# Patient Record
Sex: FEMALE | Race: BLACK OR AFRICAN AMERICAN | Employment: OTHER | URBAN - METROPOLITAN AREA
[De-identification: names, ages, dates, MRNs, and addresses within clinical notes are randomized per-mention and may not be internally consistent; named-entity substitution may affect disease eponyms.]

---

## 2018-01-10 ENCOUNTER — APPOINTMENT (OUTPATIENT)
Dept: GENERAL RADIOLOGY | Age: 83
End: 2018-01-10
Attending: EMERGENCY MEDICINE
Payer: MEDICARE

## 2018-01-10 ENCOUNTER — APPOINTMENT (OUTPATIENT)
Dept: CT IMAGING | Age: 83
End: 2018-01-10
Attending: EMERGENCY MEDICINE
Payer: MEDICARE

## 2018-01-10 ENCOUNTER — HOSPITAL ENCOUNTER (EMERGENCY)
Age: 83
Discharge: HOME | End: 2018-01-10
Attending: EMERGENCY MEDICINE
Payer: MEDICARE

## 2018-01-10 VITALS
TEMPERATURE: 98.4 F | HEIGHT: 64 IN | OXYGEN SATURATION: 96 % | DIASTOLIC BLOOD PRESSURE: 86 MMHG | SYSTOLIC BLOOD PRESSURE: 99 MMHG | RESPIRATION RATE: 23 BRPM | HEART RATE: 67 BPM | WEIGHT: 104.7 LBS | BODY MASS INDEX: 17.87 KG/M2

## 2018-01-10 DIAGNOSIS — R55 SYNCOPE AND COLLAPSE: Primary | ICD-10-CM

## 2018-01-10 LAB
ALBUMIN SERPL-MCNC: 3.2 G/DL (ref 3.5–5)
ALBUMIN/GLOB SERPL: 0.8 {RATIO} (ref 1.1–2.2)
ALP SERPL-CCNC: 68 U/L (ref 45–117)
ALT SERPL-CCNC: 16 U/L (ref 12–78)
ANION GAP SERPL CALC-SCNC: 8 MMOL/L (ref 5–15)
APPEARANCE UR: ABNORMAL
AST SERPL-CCNC: 18 U/L (ref 15–37)
BACTERIA URNS QL MICRO: NEGATIVE /HPF
BASOPHILS # BLD: 0 K/UL (ref 0–0.1)
BASOPHILS NFR BLD: 0 % (ref 0–1)
BILIRUB SERPL-MCNC: 0.4 MG/DL (ref 0.2–1)
BILIRUB UR QL CFM: NEGATIVE
BUN SERPL-MCNC: 30 MG/DL (ref 6–20)
BUN/CREAT SERPL: 16 (ref 12–20)
CALCIUM SERPL-MCNC: 8.8 MG/DL (ref 8.5–10.1)
CHLORIDE SERPL-SCNC: 105 MMOL/L (ref 97–108)
CO2 SERPL-SCNC: 28 MMOL/L (ref 21–32)
COLOR UR: ABNORMAL
CREAT SERPL-MCNC: 1.88 MG/DL (ref 0.55–1.02)
DIFFERENTIAL METHOD BLD: ABNORMAL
EOSINOPHIL # BLD: 0 K/UL (ref 0–0.4)
EOSINOPHIL NFR BLD: 0 % (ref 0–7)
EPITH CASTS URNS QL MICRO: ABNORMAL /LPF
ERYTHROCYTE [DISTWIDTH] IN BLOOD BY AUTOMATED COUNT: 13.2 % (ref 11.5–14.5)
GLOBULIN SER CALC-MCNC: 3.8 G/DL (ref 2–4)
GLUCOSE SERPL-MCNC: 173 MG/DL (ref 65–100)
GLUCOSE UR STRIP.AUTO-MCNC: NEGATIVE MG/DL
HCT VFR BLD AUTO: 41.6 % (ref 35–47)
HGB BLD-MCNC: 13.3 G/DL (ref 11.5–16)
HGB UR QL STRIP: ABNORMAL
INR BLD: 1.2 (ref 0.9–1.2)
KETONES UR QL STRIP.AUTO: NEGATIVE MG/DL
LEUKOCYTE ESTERASE UR QL STRIP.AUTO: NEGATIVE
LYMPHOCYTES # BLD: 0.5 K/UL (ref 0.8–3.5)
LYMPHOCYTES NFR BLD: 7 % (ref 12–49)
MAGNESIUM SERPL-MCNC: 2.2 MG/DL (ref 1.6–2.4)
MCH RBC QN AUTO: 30.6 PG (ref 26–34)
MCHC RBC AUTO-ENTMCNC: 32 G/DL (ref 30–36.5)
MCV RBC AUTO: 95.6 FL (ref 80–99)
MONOCYTES # BLD: 0.6 K/UL (ref 0–1)
MONOCYTES NFR BLD: 8 % (ref 5–13)
NEUTS SEG # BLD: 5.8 K/UL (ref 1.8–8)
NEUTS SEG NFR BLD: 85 % (ref 32–75)
NITRITE UR QL STRIP.AUTO: NEGATIVE
PH UR STRIP: 5 [PH] (ref 5–8)
PLATELET # BLD AUTO: 166 K/UL (ref 150–400)
POTASSIUM SERPL-SCNC: 4 MMOL/L (ref 3.5–5.1)
PROT SERPL-MCNC: 7 G/DL (ref 6.4–8.2)
PROT UR STRIP-MCNC: 100 MG/DL
RBC # BLD AUTO: 4.35 M/UL (ref 3.8–5.2)
RBC #/AREA URNS HPF: ABNORMAL /HPF (ref 0–5)
RBC MORPH BLD: ABNORMAL
SODIUM SERPL-SCNC: 141 MMOL/L (ref 136–145)
SP GR UR REFRACTOMETRY: 1.02 (ref 1–1.03)
UROBILINOGEN UR QL STRIP.AUTO: 1 EU/DL (ref 0.2–1)
WBC # BLD AUTO: 6.9 K/UL (ref 3.6–11)
WBC URNS QL MICRO: ABNORMAL /HPF (ref 0–4)

## 2018-01-10 PROCEDURE — 85025 COMPLETE CBC W/AUTO DIFF WBC: CPT

## 2018-01-10 PROCEDURE — 99285 EMERGENCY DEPT VISIT HI MDM: CPT

## 2018-01-10 PROCEDURE — 93005 ELECTROCARDIOGRAM TRACING: CPT

## 2018-01-10 PROCEDURE — 70450 CT HEAD/BRAIN W/O DYE: CPT

## 2018-01-10 PROCEDURE — 85610 PROTHROMBIN TIME: CPT

## 2018-01-10 PROCEDURE — 81001 URINALYSIS AUTO W/SCOPE: CPT

## 2018-01-10 PROCEDURE — 80053 COMPREHEN METABOLIC PANEL: CPT

## 2018-01-10 PROCEDURE — 71045 X-RAY EXAM CHEST 1 VIEW: CPT

## 2018-01-10 PROCEDURE — 83735 ASSAY OF MAGNESIUM: CPT

## 2018-01-10 PROCEDURE — 36415 COLL VENOUS BLD VENIPUNCTURE: CPT

## 2018-01-10 NOTE — ED NOTES
Daughter reports she went over to her brothers house to take her some cold medicine. Says she became slower to respond 30 minutes after taking the medicine. Tylenol Cold + Mucus Severe Cool Burst Liquid.

## 2018-01-10 NOTE — ED TRIAGE NOTES
Triage note: pt arrives via EMS from home for acute AMS since 1400. Pt has hx of dementia, per ems pt's daughter was unable to verbalize specifically what had changed. Pt is alert on arrival but confused, which is her baseline. Pt moving all extremities, no sensory deficits noted. Some difficulty in obtaining accurate exam due to confusion.

## 2018-01-10 NOTE — DISCHARGE INSTRUCTIONS
Fainting: Care Instructions  Your Care Instructions    When you faint, or pass out, you lose consciousness for a short time. A brief drop in blood flow to the brain often causes it. When you fall or lie down, more blood flows to your brain and you regain consciousness. Emotional stress, pain, or overheating-especially if you have been standing-can make you faint. In these cases, fainting is usually not serious. But fainting can be a sign of a more serious problem. Your doctor may want you to have more tests to rule out other causes. The treatment you need depends on the reason why you fainted. The doctor has checked you carefully, but problems can develop later. If you notice any problems or new symptoms, get medical treatment right away. Follow-up care is a key part of your treatment and safety. Be sure to make and go to all appointments, and call your doctor if you are having problems. It's also a good idea to know your test results and keep a list of the medicines you take. How can you care for yourself at home? · Drink plenty of fluids to prevent dehydration. If you have kidney, heart, or liver disease and have to limit fluids, talk with your doctor before you increase your fluid intake. When should you call for help? Call 911 anytime you think you may need emergency care. For example, call if:  ? · You have symptoms of a heart problem. These may include:  ¨ Chest pain or pressure. ¨ Severe trouble breathing. ¨ A fast or irregular heartbeat. ¨ Lightheadedness or sudden weakness. ¨ Coughing up pink, foamy mucus. ¨ Passing out. After you call 911, the  may tell you to chew 1 adult-strength or 2 to 4 low-dose aspirin. Wait for an ambulance. Do not try to drive yourself. ? · You have symptoms of a stroke. These may include:  ¨ Sudden numbness, tingling, weakness, or loss of movement in your face, arm, or leg, especially on only one side of your body. ¨ Sudden vision changes.   ¨ Sudden trouble speaking. ¨ Sudden confusion or trouble understanding simple statements. ¨ Sudden problems with walking or balance. ¨ A sudden, severe headache that is different from past headaches. ? · You passed out (lost consciousness) again. ? Watch closely for changes in your health, and be sure to contact your doctor if:  ? · You do not get better as expected. Where can you learn more? Go to http://damion-derick.info/. Enter S331 in the search box to learn more about \"Fainting: Care Instructions. \"  Current as of: March 20, 2017  Content Version: 11.4  © 1310-0748 [employer]. Care instructions adapted under license by [employer] (which disclaims liability or warranty for this information). If you have questions about a medical condition or this instruction, always ask your healthcare professional. [address] any warranty or liability for your use of this information.

## 2018-01-10 NOTE — ED PROVIDER NOTES
HPI Comments: 80 y.o. female with past medical history significant for Hypertension and Dementia who presents from Home via EMS with chief complaint of Evaluation for Altered Mental Status. EMS states they were called after patient's daughter \"noticed a change in the patient's cognition at around 1400\". Patient has a previous history of Dementia and is not orientated to date at baseline per patients daughter. Patient arrives to [facility] ED at baseline. Per EMS upon arrival patient was ambulatory on scene. Pt denies any acute or discomfort. Per patient's daughter, this morning patient appeared to be \"moving slower than usual, seemed slower to respond, and patient appeared to have a cold coming on\". Patient's daughter stated this afternoon she gave the patient Tylenol cold and mucus medicine and two cough drops. At around 1400 patient's daughter states she found that the patient was slumped over on floor beside the sofa and at which time patient's daughter had noticed patient defecated on herself with an episode of diarrhea. Pt's family reports recent exposure to illness described as \"patient's son and  have a cold\". There are no other acute medical concerns at this time. PCP: Leander Juarez MD    Note written by June Coffey, as dictated by Olga Stallings MD 5:18 PM    The history is provided by the patient, the EMS personnel and the nursing home. History limited by: Dementia. Past Medical History:   Diagnosis Date    DEMENTIA     Hypertension        History reviewed. No pertinent surgical history. History reviewed. No pertinent family history. Social History     Social History    Marital status:      Spouse name: N/A    Number of children: N/A    Years of education: N/A     Occupational History    Not on file.      Social History Main Topics    Smoking status: Former Smoker    Smokeless tobacco: Not on file    Alcohol use No    Drug use: No    Sexual activity: Not on file     Other Topics Concern    Not on file     Social History Narrative         ALLERGIES: Review of patient's allergies indicates no known allergies. Review of Systems   Constitutional: Negative for chills and fever. HENT: Negative for congestion. Respiratory: Negative for shortness of breath. Cardiovascular: Negative for chest pain. Gastrointestinal: Positive for diarrhea. Negative for abdominal pain, nausea and vomiting. Genitourinary: Negative for difficulty urinating and dysuria. All other systems reviewed and are negative. Vitals:    01/10/18 1744   BP: 122/53   Pulse: 80   Resp: 25   Temp: 98.4 °F (36.9 °C)   SpO2: 97%   Weight: 47.5 kg (104 lb 11.2 oz)   Height: 5' 4\" (1.626 m)            Physical Exam   Constitutional: She appears well-developed. No distress. HENT:   Head: Normocephalic and atraumatic. Eyes: Pupils are equal, round, and reactive to light. No scleral icterus. Neck: Normal range of motion. Neck supple. Cardiovascular: Normal rate and regular rhythm. Pulmonary/Chest: Effort normal and breath sounds normal.   Abdominal: Soft. She exhibits no distension. There is no tenderness. There is no rebound and no guarding. Musculoskeletal: Normal range of motion. Neurological: She is alert. No slurred speech, no aphagia, sensation and strength intact bilaterally. 4/5 strength bilaterally of lower extremities   Skin: Skin is warm and dry. She is not diaphoretic. Nursing note and vitals reviewed. Note written by Edgar Macias, as dictated by Feliberto Gross MD 5:19 PM    MDM  Number of Diagnoses or Management Options  Syncope and collapse:   Diagnosis management comments: Patient presented after an episode of possible syncope after taking Tylenol/decongestant. EKG unremarkable without significant arrhythmia, no electrolyte abnormalities, no evidence of seizure, VTach, CVA, TIA, UTI, PNA.        Amount and/or Complexity of Data Reviewed  Clinical lab tests: ordered and reviewed  Tests in the radiology section of CPT®: ordered and reviewed  Obtain history from someone other than the patient: yes  Review and summarize past medical records: yes      ED Course       Procedures    ED EKG interpretation:  Rhythm:sinus arrhythmia with 1st degree AV block  Rate (approx.): 68bpm; No STEMI. No changed when  9/5/16Note written by Yadira Boyd, as dictated by George Gutiérrez MD 6:25 PM      6:37 PM  Patient's CT results have been reviewed with the patient and the patient's family. Provider has discussed possible risks associated with syncope. Patient's family verbally conveyed their understanding and agreement of the patient's signs, symptoms, diagnosis, treatment and prognosis and additionally express desire to follow up as an outpatient. The patient and/or family express understanding of the possible adverse outcomes and declines admission at this time. Upon discussion, patients family reported they gave the patient Tylenol cold and mucus liquid medicine. This was a cold medicine with an added unknown decongestant     7:05 PM   Results showed no acute changes and patient is well appearing with normal vital signs. Will discharge home to follow-up with cardiology and PCP for additional management.

## 2018-01-11 LAB
ATRIAL RATE: 68 BPM
CALCULATED P AXIS, ECG09: 87 DEGREES
CALCULATED R AXIS, ECG10: 13 DEGREES
CALCULATED T AXIS, ECG11: 67 DEGREES
DIAGNOSIS, 93000: NORMAL
P-R INTERVAL, ECG05: 266 MS
Q-T INTERVAL, ECG07: 428 MS
QRS DURATION, ECG06: 76 MS
QTC CALCULATION (BEZET), ECG08: 455 MS
VENTRICULAR RATE, ECG03: 68 BPM

## 2018-01-11 NOTE — ED NOTES
Family verbalizes understanding of discharge instructions. Pt alert and oriented, appears in no acute distress, respirations equal and unlabored. Ambulatory upon discharge with steady gait.

## 2018-01-19 ENCOUNTER — HOSPITAL ENCOUNTER (OUTPATIENT)
Age: 83
Setting detail: OBSERVATION
Discharge: HOME HEALTH | End: 2018-01-21
Attending: EMERGENCY MEDICINE | Admitting: HOSPITALIST
Payer: MEDICARE

## 2018-01-19 ENCOUNTER — APPOINTMENT (OUTPATIENT)
Dept: GENERAL RADIOLOGY | Age: 83
End: 2018-01-19
Attending: EMERGENCY MEDICINE
Payer: MEDICARE

## 2018-01-19 DIAGNOSIS — R06.02 SOB (SHORTNESS OF BREATH): Primary | ICD-10-CM

## 2018-01-19 DIAGNOSIS — R53.1 WEAKNESS: ICD-10-CM

## 2018-01-19 DIAGNOSIS — J06.9 ACUTE UPPER RESPIRATORY INFECTION: ICD-10-CM

## 2018-01-19 LAB
ALBUMIN SERPL-MCNC: 2.7 G/DL (ref 3.5–5)
ALBUMIN/GLOB SERPL: 0.6 {RATIO} (ref 1.1–2.2)
ALP SERPL-CCNC: 87 U/L (ref 45–117)
ALT SERPL-CCNC: 21 U/L (ref 12–78)
ANION GAP SERPL CALC-SCNC: 12 MMOL/L (ref 5–15)
AST SERPL-CCNC: 22 U/L (ref 15–37)
ATRIAL RATE: 103 BPM
BASOPHILS # BLD: 0 K/UL (ref 0–0.1)
BASOPHILS NFR BLD: 0 % (ref 0–1)
BILIRUB SERPL-MCNC: 0.4 MG/DL (ref 0.2–1)
BUN SERPL-MCNC: 37 MG/DL (ref 6–20)
BUN/CREAT SERPL: 27 (ref 12–20)
CALCIUM SERPL-MCNC: 9.4 MG/DL (ref 8.5–10.1)
CALCULATED P AXIS, ECG09: 76 DEGREES
CALCULATED R AXIS, ECG10: 1 DEGREES
CALCULATED T AXIS, ECG11: 74 DEGREES
CHLORIDE SERPL-SCNC: 106 MMOL/L (ref 97–108)
CO2 SERPL-SCNC: 26 MMOL/L (ref 21–32)
CREAT SERPL-MCNC: 1.37 MG/DL (ref 0.55–1.02)
DIAGNOSIS, 93000: NORMAL
EOSINOPHIL # BLD: 0 K/UL (ref 0–0.4)
EOSINOPHIL NFR BLD: 0 % (ref 0–7)
ERYTHROCYTE [DISTWIDTH] IN BLOOD BY AUTOMATED COUNT: 13 % (ref 11.5–14.5)
FLUAV AG NPH QL IA: NEGATIVE
FLUBV AG NOSE QL IA: NEGATIVE
GLOBULIN SER CALC-MCNC: 4.3 G/DL (ref 2–4)
GLUCOSE SERPL-MCNC: 152 MG/DL (ref 65–100)
HCT VFR BLD AUTO: 41 % (ref 35–47)
HGB BLD-MCNC: 12.8 G/DL (ref 11.5–16)
LYMPHOCYTES # BLD: 1.5 K/UL (ref 0.8–3.5)
LYMPHOCYTES NFR BLD: 17 % (ref 12–49)
MAGNESIUM SERPL-MCNC: 2.7 MG/DL (ref 1.6–2.4)
MCH RBC QN AUTO: 29.5 PG (ref 26–34)
MCHC RBC AUTO-ENTMCNC: 31.2 G/DL (ref 30–36.5)
MCV RBC AUTO: 94.5 FL (ref 80–99)
MONOCYTES # BLD: 0.6 K/UL (ref 0–1)
MONOCYTES NFR BLD: 7 % (ref 5–13)
NEUTS SEG # BLD: 6.8 K/UL (ref 1.8–8)
NEUTS SEG NFR BLD: 76 % (ref 32–75)
P-R INTERVAL, ECG05: 200 MS
PLATELET # BLD AUTO: 235 K/UL (ref 150–400)
POTASSIUM SERPL-SCNC: 3.3 MMOL/L (ref 3.5–5.1)
PROT SERPL-MCNC: 7 G/DL (ref 6.4–8.2)
Q-T INTERVAL, ECG07: 360 MS
QRS DURATION, ECG06: 78 MS
QTC CALCULATION (BEZET), ECG08: 471 MS
RBC # BLD AUTO: 4.34 M/UL (ref 3.8–5.2)
SODIUM SERPL-SCNC: 144 MMOL/L (ref 136–145)
VENTRICULAR RATE, ECG03: 103 BPM
WBC # BLD AUTO: 8.8 K/UL (ref 3.6–11)

## 2018-01-19 PROCEDURE — 96372 THER/PROPH/DIAG INJ SC/IM: CPT

## 2018-01-19 PROCEDURE — 80053 COMPREHEN METABOLIC PANEL: CPT | Performed by: EMERGENCY MEDICINE

## 2018-01-19 PROCEDURE — 87804 INFLUENZA ASSAY W/OPTIC: CPT | Performed by: EMERGENCY MEDICINE

## 2018-01-19 PROCEDURE — 99218 HC RM OBSERVATION: CPT

## 2018-01-19 PROCEDURE — 83735 ASSAY OF MAGNESIUM: CPT | Performed by: EMERGENCY MEDICINE

## 2018-01-19 PROCEDURE — 93005 ELECTROCARDIOGRAM TRACING: CPT

## 2018-01-19 PROCEDURE — 77030029684 HC NEB SM VOL KT MONA -A

## 2018-01-19 PROCEDURE — A9270 NON-COVERED ITEM OR SERVICE: HCPCS | Performed by: EMERGENCY MEDICINE

## 2018-01-19 PROCEDURE — 96366 THER/PROPH/DIAG IV INF ADDON: CPT

## 2018-01-19 PROCEDURE — 96365 THER/PROPH/DIAG IV INF INIT: CPT

## 2018-01-19 PROCEDURE — 36415 COLL VENOUS BLD VENIPUNCTURE: CPT | Performed by: EMERGENCY MEDICINE

## 2018-01-19 PROCEDURE — 94762 N-INVAS EAR/PLS OXIMTRY CONT: CPT

## 2018-01-19 PROCEDURE — 96367 TX/PROPH/DG ADDL SEQ IV INF: CPT

## 2018-01-19 PROCEDURE — 74011000250 HC RX REV CODE- 250: Performed by: EMERGENCY MEDICINE

## 2018-01-19 PROCEDURE — 74011636637 HC RX REV CODE- 636/637: Performed by: EMERGENCY MEDICINE

## 2018-01-19 PROCEDURE — 74011000258 HC RX REV CODE- 258: Performed by: HOSPITALIST

## 2018-01-19 PROCEDURE — 74011250637 HC RX REV CODE- 250/637: Performed by: HOSPITALIST

## 2018-01-19 PROCEDURE — 74011000250 HC RX REV CODE- 250: Performed by: HOSPITALIST

## 2018-01-19 PROCEDURE — 71045 X-RAY EXAM CHEST 1 VIEW: CPT

## 2018-01-19 PROCEDURE — 74011250636 HC RX REV CODE- 250/636: Performed by: EMERGENCY MEDICINE

## 2018-01-19 PROCEDURE — 85025 COMPLETE CBC W/AUTO DIFF WBC: CPT | Performed by: EMERGENCY MEDICINE

## 2018-01-19 PROCEDURE — 99285 EMERGENCY DEPT VISIT HI MDM: CPT

## 2018-01-19 PROCEDURE — 94640 AIRWAY INHALATION TREATMENT: CPT

## 2018-01-19 PROCEDURE — 74011250636 HC RX REV CODE- 250/636: Performed by: HOSPITALIST

## 2018-01-19 RX ORDER — POTASSIUM CHLORIDE 750 MG/1
40 TABLET, FILM COATED, EXTENDED RELEASE ORAL EVERY 4 HOURS
Status: COMPLETED | OUTPATIENT
Start: 2018-01-19 | End: 2018-01-19

## 2018-01-19 RX ORDER — BENAZEPRIL HYDROCHLORIDE 20 MG/1
20 TABLET ORAL DAILY
COMMUNITY
End: 2018-01-21

## 2018-01-19 RX ORDER — IPRATROPIUM BROMIDE AND ALBUTEROL SULFATE 2.5; .5 MG/3ML; MG/3ML
3 SOLUTION RESPIRATORY (INHALATION)
Status: DISCONTINUED | OUTPATIENT
Start: 2018-01-19 | End: 2018-01-20

## 2018-01-19 RX ORDER — BUDESONIDE 0.5 MG/2ML
500 INHALANT ORAL
Status: DISCONTINUED | OUTPATIENT
Start: 2018-01-19 | End: 2018-01-21 | Stop reason: HOSPADM

## 2018-01-19 RX ORDER — AMLODIPINE BESYLATE 5 MG/1
5 TABLET ORAL DAILY
Status: DISCONTINUED | OUTPATIENT
Start: 2018-01-20 | End: 2018-01-21 | Stop reason: HOSPADM

## 2018-01-19 RX ORDER — ALBUTEROL SULFATE 0.83 MG/ML
2.5 SOLUTION RESPIRATORY (INHALATION)
Status: COMPLETED | OUTPATIENT
Start: 2018-01-19 | End: 2018-01-19

## 2018-01-19 RX ORDER — HYDROCHLOROTHIAZIDE 25 MG/1
12.5 TABLET ORAL DAILY
Status: DISCONTINUED | OUTPATIENT
Start: 2018-01-20 | End: 2018-01-20

## 2018-01-19 RX ORDER — SODIUM CHLORIDE 0.9 % (FLUSH) 0.9 %
5-10 SYRINGE (ML) INJECTION AS NEEDED
Status: DISCONTINUED | OUTPATIENT
Start: 2018-01-19 | End: 2018-01-21 | Stop reason: HOSPADM

## 2018-01-19 RX ORDER — HEPARIN SODIUM 5000 [USP'U]/ML
5000 INJECTION, SOLUTION INTRAVENOUS; SUBCUTANEOUS EVERY 8 HOURS
Status: DISCONTINUED | OUTPATIENT
Start: 2018-01-19 | End: 2018-01-21 | Stop reason: HOSPADM

## 2018-01-19 RX ORDER — ONDANSETRON 2 MG/ML
4 INJECTION INTRAMUSCULAR; INTRAVENOUS
Status: DISCONTINUED | OUTPATIENT
Start: 2018-01-19 | End: 2018-01-21 | Stop reason: HOSPADM

## 2018-01-19 RX ORDER — LISINOPRIL 20 MG/1
20 TABLET ORAL DAILY
Status: DISCONTINUED | OUTPATIENT
Start: 2018-01-20 | End: 2018-01-21

## 2018-01-19 RX ORDER — BENAZEPRIL HYDROCHLORIDE 10 MG/1
20 TABLET ORAL DAILY
Status: DISCONTINUED | OUTPATIENT
Start: 2018-01-20 | End: 2018-01-19 | Stop reason: CLARIF

## 2018-01-19 RX ORDER — TRAZODONE HYDROCHLORIDE 50 MG/1
50 TABLET ORAL
COMMUNITY
End: 2020-03-04

## 2018-01-19 RX ORDER — TRAZODONE HYDROCHLORIDE 50 MG/1
50 TABLET ORAL
Status: DISCONTINUED | OUTPATIENT
Start: 2018-01-19 | End: 2018-01-20

## 2018-01-19 RX ORDER — SODIUM CHLORIDE 0.9 % (FLUSH) 0.9 %
5-10 SYRINGE (ML) INJECTION EVERY 8 HOURS
Status: DISCONTINUED | OUTPATIENT
Start: 2018-01-19 | End: 2018-01-21 | Stop reason: HOSPADM

## 2018-01-19 RX ORDER — PREDNISONE 20 MG/1
20 TABLET ORAL
Status: COMPLETED | OUTPATIENT
Start: 2018-01-19 | End: 2018-01-19

## 2018-01-19 RX ADMIN — POTASSIUM CHLORIDE 40 MEQ: 750 TABLET, FILM COATED, EXTENDED RELEASE ORAL at 20:49

## 2018-01-19 RX ADMIN — ALBUTEROL SULFATE 2.5 MG: 2.5 SOLUTION RESPIRATORY (INHALATION) at 13:12

## 2018-01-19 RX ADMIN — SODIUM CHLORIDE 500 MG: 900 INJECTION, SOLUTION INTRAVENOUS at 13:22

## 2018-01-19 RX ADMIN — Medication 10 ML: at 21:00

## 2018-01-19 RX ADMIN — Medication 10 ML: at 17:12

## 2018-01-19 RX ADMIN — SODIUM CHLORIDE 1000 ML: 900 INJECTION, SOLUTION INTRAVENOUS at 13:21

## 2018-01-19 RX ADMIN — PREDNISONE 20 MG: 20 TABLET ORAL at 13:24

## 2018-01-19 RX ADMIN — HEPARIN SODIUM 5000 UNITS: 5000 INJECTION, SOLUTION INTRAVENOUS; SUBCUTANEOUS at 17:11

## 2018-01-19 RX ADMIN — BUDESONIDE 500 MCG: 0.5 INHALANT RESPIRATORY (INHALATION) at 22:17

## 2018-01-19 RX ADMIN — TRAZODONE HYDROCHLORIDE 50 MG: 50 TABLET ORAL at 20:48

## 2018-01-19 RX ADMIN — CEFTRIAXONE 1 G: 1 INJECTION, POWDER, FOR SOLUTION INTRAMUSCULAR; INTRAVENOUS at 17:10

## 2018-01-19 RX ADMIN — POTASSIUM CHLORIDE 40 MEQ: 750 TABLET, FILM COATED, EXTENDED RELEASE ORAL at 17:11

## 2018-01-19 NOTE — H&P
[address]  ACUTE CARE HISTORY AND PHYSICAL    Name:Marcos LINDSEY  MR#: [id number]  : 1928  ACCOUNT #: [de-identified]   DATE OF SERVICE: 2018    PRIMARY CARE PHYSICIAN:  KELLY Bill MD    SOURCE OF INFORMATION:  The patient and her daughter at the bedside. CHIEF COMPLAINT:  Shortness of breath and cough of 1-week duration. HISTORY OF PRESENT ILLNESS:  This is an 80-year-old [facility] American woman with past medical history significant for hypertension and dementia, who presented to [facility] Emergency Department with progressive shortness of breath and productive cough of whitish sputum for the last 1 week. She has also associated generalized weakness, and unsteady when she walked. No fever, chills, left-sided chest pain, palpitation, abdominal pain, urinary complaint or abnormal bowel movement. No history of COPD, heart disease or history of tobacco abuse. She was seen 9 days ago for the same in the ER, she was evaluated and discharged from the emergency room. On arrival to ER, her temperature was 97.9, pulse of 109, blood pressure 129/58, saturation of oxygen 91% on room air. Chest x-ray, no acute process identified. She received IV azithromycin, IV fluid, a breathing treatment, placed on oxygen, and referred to the hospitalist service for further evaluation and admission. REVIEW OF SYSTEMS:  Pertinent positive findings mentioned in HPI. All systems reviewed, not any other positive finding. PAST MEDICAL HISTORY:  1. Dementia. 2.  Hypertension. MEDICATIONS:  Prior to admission medications include hydrochlorothiazide 12.5 mg p.o. daily, benazepril 20 mg p.o. daily and trazodone 50 mg p.o. as needed for sleep. SOCIAL HISTORY:  Lives with her son. No tobacco or alcohol abuse. Ambulates independently. CODE STATUS:  FULL CODE. ADVANCED CARE PLANNING:  Advanced care planning discussed with the patient.   The patient does not have advanced care planning. SHE WANTED TO BECOME A FULL CODE, and her daughter stated Ortiz Ni and Damon Folds are her decision maker in case of emergency. PHYSICAL EXAMINATION:  VITAL SIGNS:  Blood pressure 139/51, pulse 89, temperature 97.9, respiration rate 20, saturation of oxygen 96% on room air. GENERAL APPEARANCE:  The patient is alert, not in cardiorespiratory distress. HEENT:  Dry tongue and buccal mucosa. Pink conjunctivae and anicteric sclerae. LUNGS:  Decreased bronchial breath sounds and rhonchi to auscultation bilaterally. CHEST WALL:  No tenderness or deformity. HEART:  Regular rate and rhythm. S1 and S2 normal.  No murmur or gallop. ABDOMEN:  Soft, nontender. Bowel sounds normal.  No masses or organomegaly. EXTREMITIES:  No cyanosis. There is a trace pretibial and pedal edema. CENTRAL NERVOUS SYSTEM:  Conscious and alert, oriented to person. Motor 5/5 all extremities. Sensation intact. Cranial nerves II-XII grossly intact. EKG:  Sinus tachycardia, ventricular rate 103 beats per minute, nonspecific ST and T-wave abnormalities. LABORATORY DATA:  White blood cell count 8.8, hemoglobin 12.8, platelet count 862. Chemistry:  Sodium 144, potassium 3.3, chloride 106, CO2 26, anion gap 12, glucose 152, BUN 37, creatinine 1.37, BUN/creatinine ratio 27, calcium 9.4, magnesium 2.7, total protein 7, albumin 2.7, ALT 21, AST 22, alkaline phosphatase 87. IMAGING:  Chest x-ray, no acute process identified. ASSESSMENT:  1. Shortness of breath, possibly due to acute bronchitis. 2.  Hypokalemia. 3.  Chronic kidney disease stage III. 4.  Hypertension. 5.  Dementia. PLAN:  1. Shortness of breath likely due to acute bronchitis. Admit the patient under observation, continue azithromycin, add ceftriaxone 1 g IV q.24 h. Pulmicort nebulizer treatment, p.r.n. DuoNeb neb treatment. Monitor pulse oximetry monitoring and p.r.n. oxygen support, and check echocardiography.   2.  Hypokalemia, replace with KCl and repeat potassium in a.m. 3.  Chronic kidney disease, stage III. Creatinine is stable. 4.  Hypertension. Continue home medication and monitor blood pressure. 5.  History of dementia. The patient is conscious, alert, oriented to place and person. Continue supportive care. DVT prophylaxis, heparin.       MD PEACE Rubin / MARCIE  D: 01/19/2018 15:15     T: 01/19/2018 16:02  JOB #: [id number]

## 2018-01-19 NOTE — ED NOTES
Bedside report received from SINTIA Carroll. Pt remains on monitor x3. Call bell within reach. Will continue to monitor closely.

## 2018-01-19 NOTE — ROUTINE PROCESS
TRANSFER - OUT REPORT:    Verbal report given to Tova Carvalho RN(name) on [address]  being transferred to (unit) for routine progression of care       Report consisted of patients Situation, Background, Assessment and   Recommendations(SBAR). Information from the following report(s) SBAR, ED Summary, Procedure Summary, MAR and Recent Results was reviewed with the receiving nurse. Lines:   Peripheral IV 01/19/18 Left Antecubital (Active)        Opportunity for questions and clarification was provided.       Patient transported with:   [employer]

## 2018-01-19 NOTE — IP AVS SNAPSHOT
[address] [facility] [address] [place] [address] 
[phone] Patient: Bianca Rios MRN: [id number] About your hospitalization You were admitted on:  January 19, 2018 You last received care in the:  [address] [facility] You were discharged on:  January 21, 2018 Why you were hospitalized Your primary diagnosis was:  Not on File Your diagnoses also included:  Shortness Of Breath Follow-up Information Follow up With Details Comments Contact Info Maria De Jesus Soto MD Schedule an appointment as soon as possible for a visit in 1 week For follow up after hospitalization [address] [facility] [address] [address] 
[phone] [address] [place] [address] On 1/23/2018 [address] [address] 
[phone] Discharge Orders None A check karlo indicates which time of day the medication should be taken. My Medications START taking these medications Instructions Each Dose to Equal  
 Morning Noon Evening Bedtime  
 albuterol-ipratropium 2.5 mg-0.5 mg/3 ml Nebu Commonly known as:  Priscila Turner Your last dose was: Your next dose is:    
   
   
 3 mL by Nebulization route four (4) times daily for 7 days. 3 mL  
    
   
   
   
  
 azithromycin 250 mg tablet Commonly known as:  Bg Simental Your last dose was: Your next dose is: Take 1 Tab by mouth daily for 4 days. Received first dose in the hospital.  
 250 mg  
    
   
   
   
  
 guaiFENesin 1,200 mg Ta12 ER tablet Commonly known as:  Harjeet Serrato Your last dose was: Your next dose is: Take 1 Tab by mouth two (2) times a day. Can be obtained over-the-counter 1200 mg  
    
   
   
   
  
 miscellaneous medical supply Misc Your last dose was: Your next dose is:    
   
   
 Bed Alarm for use at bedtime Nebulizer & Compressor machine Your last dose was: Your next dose is:    
   
   
 1 Each by Does Not Apply route four (4) times daily. 1 Each Nebulizer Accessories Kit Your last dose was: Your next dose is:    
   
   
 Use as needed with nebulizer  
     
   
   
   
  
 predniSONE 20 mg tablet Commonly known as:  Lazaro Nevarez Your last dose was: Your next dose is: Take 2 Tabs by mouth daily (with dinner) for 4 days. 40 mg CONTINUE taking these medications Instructions Each Dose to Equal  
 Morning Noon Evening Bedtime  
 amLODIPine 5 mg tablet Commonly known as:  Desi Slate Start taking on:  1/22/2018 Your last dose was: Your next dose is: Take 1 Tab by mouth daily. 5 mg  
    
   
   
   
  
 traZODone 50 mg tablet Commonly known as:  Selestjameel Joshi Your last dose was: Your next dose is: Take 50 mg by mouth nightly as needed for Sleep. 50 mg  
    
   
   
   
  
  
STOP taking these medications   
 benazepril 20 mg tablet Commonly known as:  LOTENSIN  
   
  
 hydroCHLOROthiazide 12.5 mg tablet Commonly known as:  HYDRODIURIL Where to Get Your Medications Information on where to get these meds will be given to you by the nurse or doctor. ! Ask your nurse or doctor about these medications  
  albuterol-ipratropium 2.5 mg-0.5 mg/3 ml Nebu  
 amLODIPine 5 mg tablet  
 azithromycin 250 mg tablet  
 guaiFENesin 1,200 mg Ta12 ER tablet  
 miscellaneous medical supply Misc Nebulizer & Compressor machine Nebulizer Accessories Kit  
 predniSONE 20 mg tablet Discharge Instructions Please bring this form with you to show your primary care provider at your follow-up appointment.  
 
Primary care provider:  Dr. Maria De Jesus Soto MD 
 
Discharging provider:  Yaa Burt MD 
 
 You have been admitted to the hospital with the following diagnoses: · Shortness of breath · Acute bronchitis FOLLOW-UP CARE RECOMMENDATIONS: 
 
APPOINTMENTS: 
Follow-up Information Follow up With Details Comments Contact Info Matt Agrawal MD Schedule an appointment as soon as possible for a visit in 1 week For follow up after hospitalization [address] [address] [address] [address] 
[phone] FOLLOW-UP TESTS recommended: Basic metabolic panel in 1 week with primary care doctor SYMPTOMS to watch for: worsening shortness of breath, fever, chills, nausea, vomiting, diarrhea. DIET/what to eat:  Cardiac Diet ACTIVITY:  Activity as tolerated and PT per Home Health EQUIPMENT needed:  Nebulizer, bed alarm Medication instructions: Please do not take your normal home blood pressure medications, Hydrochlorothiazide (HCTZ) and Benazepril. We are substituting another blood pressure medicine called Amlodipine. Please follow up with your primary care doctor in 1 week. He will check some blood work and may switch you back to your original medications. What to do if new or unexpected symptoms occur? If you experience any of the above symptoms (or should other concerns or questions arise after discharge) please call your primary care physician. Return to the emergency room if you cannot get hold of your doctor. · It is very important that you keep your follow-up appointment(s). · Please bring discharge papers, medication list (and/or medication bottles) to your follow-up appointments for review by your outpatient provider(s). · Please check the list of medications and be sure it includes every medication (even non-prescription medications) that your provider wants you to take. · It is important that you take the medication exactly as they are prescribed.   
· Keep your medication in the bottles provided by the pharmacist and keep a list of the medication names, dosages, and times to be taken in your wallet. · Do not take other medications without consulting your doctor. · If you have any questions about your medications or other instructions, please talk to your nurse or care provider before you leave the hospital. 
 
I understand that if any problems occur once I am at home I am to contact my physician. These instructions were explained to me and I had the opportunity to ask questions. Spool Announcement We are excited to announce that we are making your provider's discharge notes available to you in Spool. You will see these notes when they are completed and signed by the physician that discharged you from your recent hospital stay. If you have any questions or concerns about any information you see in Spool, please call the Health Information Department where you were seen or reach out to your Primary Care Provider for more information about your plan of care. Introducing [facility]! Debjohn Form introduces Spool patient portal. Now you can access parts of your medical record, email your doctor's office, and request medication refills online. 1. In your internet browser, go to [email or web address] 2. Click on the First Time User? Click Here link in the Sign In box. You will see the New Member Sign Up page. 3. Enter your Spool Access Code exactly as it appears below. You will not need to use this code after youve completed the sign-up process. If you do not sign up before the expiration date, you must request a new code. · Spool Access Code: [email or web address] Expires: 4/10/2018  6:56 PM 
 
4. Enter the last four digits of your Social Security Number (xxxx) and Date of Birth (mm/dd/yyyy) as indicated and click Submit. You will be taken to the next sign-up page. 5. Create a Spool ID.  This will be your Spool login ID and cannot be changed, so think of one that is secure and easy to remember. 6. Create a eCareDiary password. You can change your password at any time. 7. Enter your Password Reset Question and Answer. This can be used at a later time if you forget your password. 8. Enter your e-mail address. You will receive e-mail notification when new information is available in [address] 9. Click Sign Up. You can now view and download portions of your medical record. 10. Click the Download Summary menu link to download a portable copy of your medical information. If you have questions, please visit the Frequently Asked Questions section of the eCareDiary website. Remember, eCareDiary is NOT to be used for urgent needs. For medical emergencies, dial 911. Now available from your iPhone and Android! Providers Seen During Your Hospitalization Provider Specialty Primary office phone Mohini Douglass MD Emergency Medicine [phone] Atilio Muhammad MD Internal Medicine [phone] Juan Manuel Hubbard MD Internal Medicine [phone] Cresencio Cano MD Internal Medicine [phone] Your Primary Care Physician (PCP) Primary Care Physician Office Phone Office Fax Marahchuck Cabrera [phone] You are allergic to the following No active allergies Recent Documentation Height OB Status Smoking Status 1.626 m Postmenopausal Former Smoker Emergency Contacts Name Discharge Info Relation Home Work Mobile Clotilde Rios DISCHARGE CAREGIVER [3] Daughter [21] [phone] Kanchanchuck Tejada 50 CAREGIVER [3] Daughter [21] [phone] Patient Belongings The following personal items are in your possession at time of discharge: 
  Dental Appliances: None  Visual Aid: None      Home Medications: None   Jewelry: None  Clothing: At bedside    Other Valuables: None Please provide this summary of care documentation to your next provider. Signatures-by signing, you are acknowledging that this After Visit Summary has been reviewed with you and you have received a copy. Patient Signature:  ____________________________________________________________ Date:  ____________________________________________________________  
  
Kasia Saver Provider Signature:  ____________________________________________________________ Date:  ____________________________________________________________

## 2018-01-19 NOTE — PROGRESS NOTES
Primary Nurse Srikanth Newman RN and Jose Guadalupe Stoll RN performed a dual skin assessment on this patient No impairment noted  Philpip score is 19

## 2018-01-19 NOTE — PROGRESS NOTES
TRANSFER - IN REPORT:    Verbal report received from Patra Siemens RN(name) on [address]  being received from ER(unit) for routine progression of care      Report consisted of patients Situation, Background, Assessment and   Recommendations(SBAR). Information from the following report(s) SBAR, Kardex, Intake/Output and MAR was reviewed with the receiving nurse. Opportunity for questions and clarification was provided. Assessment completed upon patients arrival to unit and care assumed.

## 2018-01-19 NOTE — ED TRIAGE NOTES
TRIAGE NOTE: Patient arrives via EMS from home for cough. Seen 1 week ago for same, clear chest xray. 1 duo neb en route. Missed PCP appointment this morning.

## 2018-01-19 NOTE — ED PROVIDER NOTES
HPI Comments: 80 y.o. female with past medical history significant for HTN and dementia who presents from home via EMS with chief complaint of SOB. The son is the main historian. The son reports that the pt has had SOB, a productive cough, an unsteady gait, and CP over the last 4 days. The pt normally is able to ambulate without assistance but over the last few days the son has had to hold her to walk her to the bathroom. The pt has been laying in bed a majority of the time over the last 4 days. The pt lives with her son. There are no other acute medical concerns at this time. Social hx: former smoker, no EtOH use  PCP: Maria De Jesus Soto MD    Full history, physical exam, and ROS unable to be obtained due to:  dementia. Note written by Yadira Burdick, as dictated by Rohit Isabel MD 11:53 AM      The history is provided by the patient and a relative. No  was used. Past Medical History:   Diagnosis Date    DEMENTIA     Hypertension        History reviewed. No pertinent surgical history. History reviewed. No pertinent family history. Social History     Social History    Marital status:      Spouse name: N/A    Number of children: N/A    Years of education: N/A     Occupational History    Not on file. Social History Main Topics    Smoking status: Former Smoker    Smokeless tobacco: Not on file    Alcohol use No    Drug use: No    Sexual activity: Not on file     Other Topics Concern    Not on file     Social History Narrative         ALLERGIES: Review of patient's allergies indicates no known allergies. Review of Systems   Unable to perform ROS: Dementia   Neurological: Positive for weakness (unsteady gait).        Vitals:    01/19/18 1134   BP: 129/58   Pulse: (!) 109   Resp: 25   Temp: 97.9 °F (36.6 °C)   SpO2: 91%   Height: 5' 4\" (1.626 m)            Physical Exam   Constitutional:   Frail;  Elderly;  Thin;     HENT:   Head: Normocephalic and atraumatic. Mouth/Throat: Oropharynx is clear and moist.   Eyes: EOM are normal. Pupils are equal, round, and reactive to light. Neck: Normal range of motion. Neck supple. Cardiovascular: Regular rhythm, normal heart sounds and intact distal pulses. Exam reveals no gallop and no friction rub. No murmur heard. Tachycardic at 115   Pulmonary/Chest: Effort normal. No respiratory distress. She has wheezes (scattered expiratory wheezes ). She has no rales. Moist cough and is unable to bring up sputum;  tachypneic at 30 times a minute; Abdominal: Soft. There is no tenderness. There is no rebound. Musculoskeletal: Normal range of motion. She exhibits no tenderness. Neurological: She is alert. No cranial nerve deficit. Motor; symmetric   Skin: No erythema. Psychiatric:   Poor memory of recent events; Son is main historian;     Nursing note and vitals reviewed. Note written by Yadira Estrada, as dictated by Darlin Tate MD 11:55 AM      [facility]  ED Course       Procedures         Note: Patient has had a prolonged respiratory illness. Influenza test is negative today; however she may still have influenza and is getting worse rather than better; chest x-ray is clear today. It is possible she has pneumonia that is not showing up on the chest x-ray ; patient is tachypneic; she is breathing 30 times a minute. She does not have obvious wheezing but will be started on prednisone and albuterol treatments. Patient is 90-93 percent. She will be started on nasal oxygen. She seems to be dehydrated and will be given a liter or normal saline. Patient is quite weak; her son is having to help her walk; plan is for the patient for IV antibiotics, IV fluids, oxygen. Nebulizer treatments. Daughter does not feel like the patient is safe at home.   Darlin Tate MD  1:01 PM    CONSULT NOTE:  2:27 PM Darlin Tate MD spoke with Dr. Robert He MD , Consult for Hospitalist.  Discussed available diagnostic tests and clinical findings. He is in agreement with care plans as outlined. He will see and admit the pt.

## 2018-01-19 NOTE — IP AVS SNAPSHOT
[address] [facility] [address] 
[phone] Patient: Yaquelin Rios MRN: [id number] DAF:0[birth year]28/[birth year] A check karlo indicates which time of day the medication should be taken. My Medications START taking these medications Instructions Each Dose to Equal  
 Morning Noon Evening Bedtime  
 albuterol-ipratropium 2.5 mg-0.5 mg/3 ml Nebu Commonly known as:  Patricia Curtis Your last dose was: Your next dose is:    
   
   
 3 mL by Nebulization route four (4) times daily for 7 days. 3 mL  
    
   
   
   
  
 azithromycin 250 mg tablet Commonly known as:  Duncan Aguillon Your last dose was: Your next dose is: Take 1 Tab by mouth daily for 4 days. Received first dose in the hospital.  
 250 mg  
    
   
   
   
  
 guaiFENesin 1,200 mg Ta12 ER tablet Commonly known as:  GetMaid Your last dose was: Your next dose is: Take 1 Tab by mouth two (2) times a day. Can be obtained over-the-counter 1200 mg  
    
   
   
   
  
 miscellaneous medical supply Misc Your last dose was: Your next dose is:    
   
   
 Bed Alarm for use at bedtime Nebulizer & Compressor machine Your last dose was: Your next dose is:    
   
   
 1 Each by Does Not Apply route four (4) times daily. 1 Each Nebulizer Accessories Kit Your last dose was: Your next dose is:    
   
   
 Use as needed with nebulizer  
     
   
   
   
  
 predniSONE 20 mg tablet Commonly known as:  Ling Mojica Your last dose was: Your next dose is: Take 2 Tabs by mouth daily (with dinner) for 4 days. 40 mg CONTINUE taking these medications Instructions Each Dose to Equal  
 Morning Noon Evening Bedtime  
 amLODIPine 5 mg tablet Commonly known as:  Kem Siegel Start taking on:  1/22/2018 Your last dose was: Your next dose is: Take 1 Tab by mouth daily. 5 mg  
    
   
   
   
  
 traZODone 50 mg tablet Commonly known as:  Domingo Hood Your last dose was: Your next dose is: Take 50 mg by mouth nightly as needed for Sleep. 50 mg  
    
   
   
   
  
  
STOP taking these medications   
 benazepril 20 mg tablet Commonly known as:  LOTENSIN  
   
  
 hydroCHLOROthiazide 12.5 mg tablet Commonly known as:  HYDRODIURIL Where to Get Your Medications Information on where to get these meds will be given to you by the nurse or doctor. ! Ask your nurse or doctor about these medications  
  albuterol-ipratropium 2.5 mg-0.5 mg/3 ml Nebu  
 amLODIPine 5 mg tablet  
 azithromycin 250 mg tablet  
 guaiFENesin 1,200 mg Ta12 ER tablet  
 miscellaneous medical supply Misc Nebulizer & Compressor machine Nebulizer Accessories Kit  
 predniSONE 20 mg tablet

## 2018-01-19 NOTE — PROGRESS NOTES
Admission Medication Reconciliation:    Comments/Recommendations: This medication history was obtained from patient's family and rx query; (s)he appears to be a moderate historian. An RX Query is available. Medications added: benazapril, trazodone  Medications deleted: ibuprofen, amlodipine  Medications amended: n/a    Last doses of medication: patient did take her bp meds this am.  Review of Allergies: nkda      Significant PMH/Disease States:   Past Medical History:   Diagnosis Date    DEMENTIA     Hypertension        Chief Complaint for this Admission:    Chief Complaint   Patient presents with    Cough       Allergies:  Review of patient's allergies indicates no known allergies. Prior to Admission Medications:   Prior to Admission Medications   Prescriptions Last Dose Informant Patient Reported? Taking? Hydrochlorothiazide 12.5 mg tablet 1/19/2018  Yes Yes   Sig: Take 12.5 mg by mouth daily. benazepril (LOTENSIN) 20 mg tablet 1/18/2018  Yes Yes   Sig: Take 20 mg by mouth daily. traZODone (DESYREL) 50 mg tablet 1/19/2018  Yes Yes   Sig: Take 50 mg by mouth nightly as needed for Sleep. Facility-Administered Medications: None         Thank you for allowing me to participate in the care of this patient. Please contact the pharmacy () or the medication reconciliation pharmacy () with any questions.     Marinell Ganser, HienD

## 2018-01-20 LAB
ALBUMIN SERPL-MCNC: 2.3 G/DL (ref 3.5–5)
ALBUMIN/GLOB SERPL: 0.6 {RATIO} (ref 1.1–2.2)
ALP SERPL-CCNC: 75 U/L (ref 45–117)
ALT SERPL-CCNC: 19 U/L (ref 12–78)
ANION GAP SERPL CALC-SCNC: 6 MMOL/L (ref 5–15)
AST SERPL-CCNC: 15 U/L (ref 15–37)
BILIRUB SERPL-MCNC: 0.2 MG/DL (ref 0.2–1)
BUN SERPL-MCNC: 31 MG/DL (ref 6–20)
BUN/CREAT SERPL: 31 (ref 12–20)
CALCIUM SERPL-MCNC: 8.4 MG/DL (ref 8.5–10.1)
CHLORIDE SERPL-SCNC: 111 MMOL/L (ref 97–108)
CO2 SERPL-SCNC: 25 MMOL/L (ref 21–32)
CREAT SERPL-MCNC: 0.99 MG/DL (ref 0.55–1.02)
ERYTHROCYTE [DISTWIDTH] IN BLOOD BY AUTOMATED COUNT: 13 % (ref 11.5–14.5)
GLOBULIN SER CALC-MCNC: 3.9 G/DL (ref 2–4)
GLUCOSE SERPL-MCNC: 111 MG/DL (ref 65–100)
HCT VFR BLD AUTO: 33.1 % (ref 35–47)
HGB BLD-MCNC: 10.5 G/DL (ref 11.5–16)
MCH RBC QN AUTO: 29.3 PG (ref 26–34)
MCHC RBC AUTO-ENTMCNC: 31.7 G/DL (ref 30–36.5)
MCV RBC AUTO: 92.5 FL (ref 80–99)
PLATELET # BLD AUTO: 229 K/UL (ref 150–400)
POTASSIUM SERPL-SCNC: 5.2 MMOL/L (ref 3.5–5.1)
PROT SERPL-MCNC: 6.2 G/DL (ref 6.4–8.2)
RBC # BLD AUTO: 3.58 M/UL (ref 3.8–5.2)
SODIUM SERPL-SCNC: 142 MMOL/L (ref 136–145)
WBC # BLD AUTO: 10.7 K/UL (ref 3.6–11)

## 2018-01-20 PROCEDURE — 94664 DEMO&/EVAL PT USE INHALER: CPT

## 2018-01-20 PROCEDURE — 96372 THER/PROPH/DIAG INJ SC/IM: CPT

## 2018-01-20 PROCEDURE — 74011000250 HC RX REV CODE- 250: Performed by: HOSPITALIST

## 2018-01-20 PROCEDURE — 99218 HC RM OBSERVATION: CPT

## 2018-01-20 PROCEDURE — 74011250637 HC RX REV CODE- 250/637: Performed by: HOSPITALIST

## 2018-01-20 PROCEDURE — 74011636637 HC RX REV CODE- 636/637: Performed by: INTERNAL MEDICINE

## 2018-01-20 PROCEDURE — 74011250636 HC RX REV CODE- 250/636: Performed by: HOSPITALIST

## 2018-01-20 PROCEDURE — 74011000250 HC RX REV CODE- 250: Performed by: INTERNAL MEDICINE

## 2018-01-20 PROCEDURE — 85027 COMPLETE CBC AUTOMATED: CPT | Performed by: HOSPITALIST

## 2018-01-20 PROCEDURE — 77010033678 HC OXYGEN DAILY

## 2018-01-20 PROCEDURE — A9270 NON-COVERED ITEM OR SERVICE: HCPCS | Performed by: INTERNAL MEDICINE

## 2018-01-20 PROCEDURE — 36415 COLL VENOUS BLD VENIPUNCTURE: CPT | Performed by: HOSPITALIST

## 2018-01-20 PROCEDURE — 74011250637 HC RX REV CODE- 250/637: Performed by: INTERNAL MEDICINE

## 2018-01-20 PROCEDURE — 93306 TTE W/DOPPLER COMPLETE: CPT

## 2018-01-20 PROCEDURE — 80053 COMPREHEN METABOLIC PANEL: CPT | Performed by: HOSPITALIST

## 2018-01-20 PROCEDURE — 77030027138 HC INCENT SPIROMETER -A

## 2018-01-20 PROCEDURE — 94640 AIRWAY INHALATION TREATMENT: CPT

## 2018-01-20 RX ORDER — ZOLPIDEM TARTRATE 5 MG/1
5 TABLET ORAL
Status: DISCONTINUED | OUTPATIENT
Start: 2018-01-20 | End: 2018-01-21 | Stop reason: HOSPADM

## 2018-01-20 RX ORDER — AZITHROMYCIN 250 MG/1
500 TABLET, FILM COATED ORAL EVERY 24 HOURS
Status: DISCONTINUED | OUTPATIENT
Start: 2018-01-20 | End: 2018-01-21 | Stop reason: HOSPADM

## 2018-01-20 RX ORDER — POTASSIUM CHLORIDE 14.9 MG/ML
10 INJECTION INTRAVENOUS ONCE
Status: DISCONTINUED | OUTPATIENT
Start: 2018-01-20 | End: 2018-01-20

## 2018-01-20 RX ORDER — IPRATROPIUM BROMIDE AND ALBUTEROL SULFATE 2.5; .5 MG/3ML; MG/3ML
3 SOLUTION RESPIRATORY (INHALATION)
Status: DISCONTINUED | OUTPATIENT
Start: 2018-01-20 | End: 2018-01-20

## 2018-01-20 RX ORDER — PREDNISONE 10 MG/1
40 TABLET ORAL
Status: DISCONTINUED | OUTPATIENT
Start: 2018-01-20 | End: 2018-01-21 | Stop reason: HOSPADM

## 2018-01-20 RX ORDER — IPRATROPIUM BROMIDE AND ALBUTEROL SULFATE 2.5; .5 MG/3ML; MG/3ML
3 SOLUTION RESPIRATORY (INHALATION)
Status: DISCONTINUED | OUTPATIENT
Start: 2018-01-21 | End: 2018-01-21

## 2018-01-20 RX ADMIN — IPRATROPIUM BROMIDE AND ALBUTEROL SULFATE 3 ML: .5; 3 SOLUTION RESPIRATORY (INHALATION) at 20:01

## 2018-01-20 RX ADMIN — HEPARIN SODIUM 5000 UNITS: 5000 INJECTION, SOLUTION INTRAVENOUS; SUBCUTANEOUS at 16:36

## 2018-01-20 RX ADMIN — LISINOPRIL 20 MG: 20 TABLET ORAL at 08:44

## 2018-01-20 RX ADMIN — AMLODIPINE BESYLATE 5 MG: 5 TABLET ORAL at 08:44

## 2018-01-20 RX ADMIN — IPRATROPIUM BROMIDE AND ALBUTEROL SULFATE 3 ML: .5; 3 SOLUTION RESPIRATORY (INHALATION) at 16:48

## 2018-01-20 RX ADMIN — BUDESONIDE 500 MCG: 0.5 INHALANT RESPIRATORY (INHALATION) at 20:01

## 2018-01-20 RX ADMIN — PREDNISONE 40 MG: 10 TABLET ORAL at 16:36

## 2018-01-20 RX ADMIN — HEPARIN SODIUM 5000 UNITS: 5000 INJECTION, SOLUTION INTRAVENOUS; SUBCUTANEOUS at 08:46

## 2018-01-20 RX ADMIN — HEPARIN SODIUM 5000 UNITS: 5000 INJECTION, SOLUTION INTRAVENOUS; SUBCUTANEOUS at 00:34

## 2018-01-20 RX ADMIN — HYDROCHLOROTHIAZIDE 12.5 MG: 25 TABLET ORAL at 08:44

## 2018-01-20 RX ADMIN — Medication 10 ML: at 06:00

## 2018-01-20 RX ADMIN — ZOLPIDEM TARTRATE 5 MG: 5 TABLET ORAL at 21:07

## 2018-01-20 RX ADMIN — BUDESONIDE 500 MCG: 0.5 INHALANT RESPIRATORY (INHALATION) at 16:49

## 2018-01-20 RX ADMIN — AZITHROMYCIN 500 MG: 250 TABLET, FILM COATED ORAL at 14:55

## 2018-01-20 NOTE — ROUTINE PROCESS
2732: Pt was really confused and tried to get out of the bed and didn't listen to what other people told her to do.

## 2018-01-20 NOTE — PROGRESS NOTES
Spoke with Dr. Thakkar Gravely about bedtime medications. Telephone with readback, discontinued Trazadone and placed order for Ambien, 5 mg PO at bedtime.

## 2018-01-20 NOTE — ROUTINE PROCESS
Bedside and Verbal shift change report given to Tyrell Cohen (oncoming nurse) by Xavier Craft (offgoing nurse). Report included the following information SBAR, Kardex, ED Summary, Intake/Output, MAR and Recent Results.

## 2018-01-20 NOTE — PROGRESS NOTES
Bedside shift change report given to Becki Gonzalez (oncoming nurse) by Hanane Tejada (offgoing nurse). Report included the following information SBAR, Kardex, Intake/Output and MAR.

## 2018-01-20 NOTE — PROGRESS NOTES
Hospitalist Progress Note  Dacia Camarena MD  Answering service: [phone] from in house phone        Date of Service:  2018  NAME:  Mi Ferrara  :  1928  MRN:  [id number]      Admission Summary: This is an 80-year-old [facility] [employer] woman with past medical history significant for hypertension and dementia, who presented to [facility] Emergency Department with progressive shortness of breath and productive cough of whitish sputum for the last 1 week. She has also associated generalized weakness, and unsteady when she walked. No fever, chills, left-sided chest pain, palpitation, abdominal pain, urinary complaint or abnormal bowel movement. No history of COPD, heart disease or history of tobacco abuse. She was seen 9 days ago for the same in the ER, she was evaluated and discharged from the emergency room. This time in the [facility] did not show any acute process. Lab showed potassium 3.3,creatinine 1.37 with bun 37. Interval history / Subjective:   Daughter in the room,saying that patient normally more active although the dementia. She is still coughing. The daughter is concerned that patient seems restless like someone uncomfortable in a new environment . This morning she was sitting in the nursing station to keep her calm. Nurse reporting that patient is not keeping her iv line. Assessment & Plan:     1-Acute bronchitis - causing sob    -Pt has wheezing on lungs auscultation. -CXR no acute process    -Continue erum-neb,symbicort    -Continue zithromax 500 mg po daily    -Prednisone 40 mg po daily x 4 days    2-LEATHA:likely poor po intake with patient being on diuretic    -Creat 1.37,bun 37 POA    -Creatinine 0.99 today and bun 31.    -Hold diuretic for now    3-Hypokalemia    -Corrected    4-Hypertension    -Continue lisinopril    5-Dementia    -Supportive care. So patient has good level of interaction    -Has strong family support as daughter is here with her    6-Weakness:    -PT    -According to daughter,patient active at home      Code status:Full code  DVT prophylaxis:sc heparin  Disposition:possibly 1/21  Care Plan discussed with:nurse,patient,daughter       Hospital Problems  Never Reviewed          Codes Class Noted POA    Shortness of breath ICD-10-CM: R06.02  ICD-9-CM: 786.05  1/19/2018 Unknown                Review of Systems:   A comprehensive review of systems was negative except for that written in the HPI. Vital Signs:    Last 24hrs VS reviewed since prior progress note. Most recent are:  Visit Vitals    /77    Pulse 94    Temp 98 °F (36.7 °C)    Resp 15    Ht 5' 4\" (1.626 m)    SpO2 95%         Intake/Output Summary (Last 24 hours) at 01/20/18 1226  Last data filed at 01/19/18 2218   Gross per 24 hour   Intake                0 ml   Output              400 ml   Net             -400 ml        Physical Examination:             Constitutional:  No acute distress, cooperative, pleasant    ENT:  Oral mucous moist, oropharynx benign. Neck supple,    Resp:  Wheezing bilateral. No accessory muscle use   CV:  Regular rhythm, normal rate, no murmurs, gallops, rubs    GI:  Soft, non distended, non tender. normoactive bowel sounds, no hepatosplenomegaly     Musculoskeletal:  No edema, warm, 2+ pulses throughout    Neurologic:  Moves all extremities.   AAOx3, CN II-XII reviewed            Data Review:    Review and/or order of clinical lab test      Labs:     Recent Labs      01/20/18   0413  01/19/18   1210   WBC  10.7  8.8   HGB  10.5*  12.8   HCT  33.1*  41.0   PLT  229  235     Recent Labs      01/20/18   0413  01/19/18   1210   NA  142  144   K  5.2*  3.3*   CL  111*  106   CO2  25  26   BUN  31*  37*   CREA  0.99  1.37*   GLU  111*  152*   CA  8.4*  9.4   MG   --   2.7*     Recent Labs      01/20/18   0413  01/19/18   1210   SGOT  15  22   ALT  19  21   AP  75  87   TBILI  0.2  0.4   TP  6.2*  7.0 ALB  2.3*  2.7*   GLOB  3.9  4.3*     No results for input(s): INR, PTP, APTT in the last 72 hours. No lab exists for component: INREXT   No results for input(s): FE, TIBC, PSAT, FERR in the last 72 hours. No results found for: FOL, RBCF   No results for input(s): PH, PCO2, PO2 in the last 72 hours. No results for input(s): CPK, CKNDX, TROIQ in the last 72 hours.     No lab exists for component: CPKMB  No results found for: CHOL, CHOLX, CHLST, CHOLV, HDL, LDL, LDLC, DLDLP, TGLX, TRIGL, TRIGP, CHHD, CHHDX  No results found for: [facility]  Lab Results   Component Value Date/Time    Color DARK YELLOW 01/10/2018 05:45 PM    Appearance CLOUDY 01/10/2018 05:45 PM    Specific gravity 1.021 01/10/2018 05:45 PM    pH (UA) 5.0 01/10/2018 05:45 PM    Protein 100 01/10/2018 05:45 PM    Glucose NEGATIVE  01/10/2018 05:45 PM    Ketone NEGATIVE  01/10/2018 05:45 PM    Bilirubin NEGATIVE  01/22/2011 01:00 PM    Urobilinogen 1.0 01/10/2018 05:45 PM    Nitrites NEGATIVE  01/10/2018 05:45 PM    Leukocyte Esterase NEGATIVE  01/10/2018 05:45 PM    Epithelial cells FEW 01/10/2018 05:45 PM    Bacteria NEGATIVE  01/10/2018 05:45 PM    WBC 0-4 01/10/2018 05:45 PM    RBC 0-5 01/10/2018 05:45 PM         Medications Reviewed:     Current Facility-Administered Medications   Medication Dose Route Frequency    potassium chloride 10 mEq in 50 ml IVPB  10 mEq IntraVENous ONCE    amLODIPine (NORVASC) tablet 5 mg  5 mg Oral DAILY    hydroCHLOROthiazide (HYDRODIURIL) tablet 12.5 mg  12.5 mg Oral DAILY    sodium chloride (NS) flush 5-10 mL  5-10 mL IntraVENous Q8H    sodium chloride (NS) flush 5-10 mL  5-10 mL IntraVENous PRN    albuterol-ipratropium (DUO-NEB) 2.5 MG-0.5 MG/3 ML  3 mL Nebulization Q4H PRN    heparin (porcine) injection 5,000 Units  5,000 Units SubCUTAneous Q8H    ondansetron (ZOFRAN) injection 4 mg  4 mg IntraVENous Q4H PRN    budesonide (PULMICORT) 500 mcg/2 ml nebulizer suspension  500 mcg Nebulization BID RT    traZODone (DESYREL) tablet 50 mg  50 mg Oral QHS PRN    lisinopril (PRINIVIL, ZESTRIL) tablet 20 mg  20 mg Oral DAILY     ______________________________________________________________________  EXPECTED LENGTH OF STAY: - - -  ACTUAL LENGTH OF STAY:          0                 Karla Corona MD

## 2018-01-21 ENCOUNTER — HOME HEALTH ADMISSION (OUTPATIENT)
Dept: HOME HEALTH SERVICES | Facility: HOME HEALTH | Age: 83
End: 2018-01-21
Payer: MEDICARE

## 2018-01-21 VITALS
HEIGHT: 64 IN | SYSTOLIC BLOOD PRESSURE: 118 MMHG | OXYGEN SATURATION: 95 % | HEART RATE: 90 BPM | DIASTOLIC BLOOD PRESSURE: 56 MMHG | RESPIRATION RATE: 14 BRPM | TEMPERATURE: 99 F

## 2018-01-21 LAB
ANION GAP SERPL CALC-SCNC: 7 MMOL/L (ref 5–15)
BASOPHILS # BLD: 0 K/UL (ref 0–0.1)
BASOPHILS NFR BLD: 0 % (ref 0–1)
BUN SERPL-MCNC: 27 MG/DL (ref 6–20)
BUN/CREAT SERPL: 25 (ref 12–20)
CALCIUM SERPL-MCNC: 8.8 MG/DL (ref 8.5–10.1)
CHLORIDE SERPL-SCNC: 107 MMOL/L (ref 97–108)
CO2 SERPL-SCNC: 25 MMOL/L (ref 21–32)
CREAT SERPL-MCNC: 1.08 MG/DL (ref 0.55–1.02)
DIFFERENTIAL METHOD BLD: ABNORMAL
EOSINOPHIL # BLD: 0 K/UL (ref 0–0.4)
EOSINOPHIL NFR BLD: 0 % (ref 0–7)
ERYTHROCYTE [DISTWIDTH] IN BLOOD BY AUTOMATED COUNT: 13.1 % (ref 11.5–14.5)
GLUCOSE SERPL-MCNC: 121 MG/DL (ref 65–100)
HCT VFR BLD AUTO: 33.9 % (ref 35–47)
HGB BLD-MCNC: 11 G/DL (ref 11.5–16)
LYMPHOCYTES # BLD: 0.8 K/UL (ref 0.8–3.5)
LYMPHOCYTES NFR BLD: 10 % (ref 12–49)
MCH RBC QN AUTO: 29.8 PG (ref 26–34)
MCHC RBC AUTO-ENTMCNC: 32.4 G/DL (ref 30–36.5)
MCV RBC AUTO: 91.9 FL (ref 80–99)
MONOCYTES # BLD: 0.5 K/UL (ref 0–1)
MONOCYTES NFR BLD: 6 % (ref 5–13)
NEUTS SEG # BLD: 7.1 K/UL (ref 1.8–8)
NEUTS SEG NFR BLD: 84 % (ref 32–75)
PLATELET # BLD AUTO: 238 K/UL (ref 150–400)
POTASSIUM SERPL-SCNC: 5 MMOL/L (ref 3.5–5.1)
RBC # BLD AUTO: 3.69 M/UL (ref 3.8–5.2)
RBC MORPH BLD: ABNORMAL
SODIUM SERPL-SCNC: 139 MMOL/L (ref 136–145)
WBC # BLD AUTO: 8.4 K/UL (ref 3.6–11)

## 2018-01-21 PROCEDURE — 97165 OT EVAL LOW COMPLEX 30 MIN: CPT

## 2018-01-21 PROCEDURE — 74011000250 HC RX REV CODE- 250: Performed by: HOSPITALIST

## 2018-01-21 PROCEDURE — G8987 SELF CARE CURRENT STATUS: HCPCS

## 2018-01-21 PROCEDURE — 74011250637 HC RX REV CODE- 250/637: Performed by: HOSPITALIST

## 2018-01-21 PROCEDURE — 97535 SELF CARE MNGMENT TRAINING: CPT

## 2018-01-21 PROCEDURE — 85025 COMPLETE CBC W/AUTO DIFF WBC: CPT | Performed by: INTERNAL MEDICINE

## 2018-01-21 PROCEDURE — G8979 MOBILITY GOAL STATUS: HCPCS

## 2018-01-21 PROCEDURE — 94640 AIRWAY INHALATION TREATMENT: CPT

## 2018-01-21 PROCEDURE — 36415 COLL VENOUS BLD VENIPUNCTURE: CPT | Performed by: INTERNAL MEDICINE

## 2018-01-21 PROCEDURE — 97161 PT EVAL LOW COMPLEX 20 MIN: CPT

## 2018-01-21 PROCEDURE — 74011250637 HC RX REV CODE- 250/637: Performed by: INTERNAL MEDICINE

## 2018-01-21 PROCEDURE — G8988 SELF CARE GOAL STATUS: HCPCS

## 2018-01-21 PROCEDURE — 99218 HC RM OBSERVATION: CPT

## 2018-01-21 PROCEDURE — 74011250636 HC RX REV CODE- 250/636: Performed by: HOSPITALIST

## 2018-01-21 PROCEDURE — 80048 BASIC METABOLIC PNL TOTAL CA: CPT | Performed by: INTERNAL MEDICINE

## 2018-01-21 PROCEDURE — 96372 THER/PROPH/DIAG INJ SC/IM: CPT

## 2018-01-21 PROCEDURE — G8978 MOBILITY CURRENT STATUS: HCPCS

## 2018-01-21 RX ORDER — AZITHROMYCIN 250 MG/1
250 TABLET, FILM COATED ORAL DAILY
Qty: 4 TAB | Refills: 0 | Status: SHIPPED | OUTPATIENT
Start: 2018-01-21 | End: 2018-01-25

## 2018-01-21 RX ORDER — GUAIFENESIN 600 MG/1
1200 TABLET, EXTENDED RELEASE ORAL 2 TIMES DAILY
Status: DISCONTINUED | OUTPATIENT
Start: 2018-01-21 | End: 2018-01-21 | Stop reason: HOSPADM

## 2018-01-21 RX ORDER — ALBUTEROL SULFATE 0.83 MG/ML
2.5 SOLUTION RESPIRATORY (INHALATION)
Status: DISCONTINUED | OUTPATIENT
Start: 2018-01-21 | End: 2018-01-21 | Stop reason: HOSPADM

## 2018-01-21 RX ORDER — PREDNISONE 20 MG/1
40 TABLET ORAL
Qty: 8 TAB | Refills: 0 | Status: SHIPPED | OUTPATIENT
Start: 2018-01-21 | End: 2018-01-25

## 2018-01-21 RX ORDER — AMLODIPINE BESYLATE 5 MG/1
5 TABLET ORAL DAILY
Qty: 30 TAB | Refills: 0 | Status: SHIPPED | OUTPATIENT
Start: 2018-01-22

## 2018-01-21 RX ORDER — NEBULIZER AND COMPRESSOR
1 EACH MISCELLANEOUS 4 TIMES DAILY
Qty: 1 EACH | Refills: 0 | Status: SHIPPED | OUTPATIENT
Start: 2018-01-21 | End: 2020-03-04

## 2018-01-21 RX ORDER — IPRATROPIUM BROMIDE AND ALBUTEROL SULFATE 2.5; .5 MG/3ML; MG/3ML
3 SOLUTION RESPIRATORY (INHALATION) 4 TIMES DAILY
Qty: 30 NEBULE | Refills: 0 | Status: SHIPPED | OUTPATIENT
Start: 2018-01-21 | End: 2018-01-28

## 2018-01-21 RX ORDER — IPRATROPIUM BROMIDE AND ALBUTEROL SULFATE 2.5; .5 MG/3ML; MG/3ML
3 SOLUTION RESPIRATORY (INHALATION)
Status: DISCONTINUED | OUTPATIENT
Start: 2018-01-21 | End: 2018-01-21 | Stop reason: HOSPADM

## 2018-01-21 RX ADMIN — GUAIFENESIN 1200 MG: 600 TABLET, EXTENDED RELEASE ORAL at 09:31

## 2018-01-21 RX ADMIN — IPRATROPIUM BROMIDE AND ALBUTEROL SULFATE 3 ML: .5; 3 SOLUTION RESPIRATORY (INHALATION) at 08:36

## 2018-01-21 RX ADMIN — AMLODIPINE BESYLATE 5 MG: 5 TABLET ORAL at 09:32

## 2018-01-21 RX ADMIN — HEPARIN SODIUM 5000 UNITS: 5000 INJECTION, SOLUTION INTRAVENOUS; SUBCUTANEOUS at 09:32

## 2018-01-21 RX ADMIN — AZITHROMYCIN 500 MG: 250 TABLET, FILM COATED ORAL at 13:45

## 2018-01-21 RX ADMIN — BUDESONIDE 500 MCG: 0.5 INHALANT RESPIRATORY (INHALATION) at 08:36

## 2018-01-21 RX ADMIN — LISINOPRIL 20 MG: 20 TABLET ORAL at 09:31

## 2018-01-21 RX ADMIN — IPRATROPIUM BROMIDE AND ALBUTEROL SULFATE 3 ML: .5; 3 SOLUTION RESPIRATORY (INHALATION) at 11:29

## 2018-01-21 NOTE — PROGRESS NOTES
Problem: Mobility Impaired (Adult and Pediatric)  Goal: *Acute Goals and Plan of Care (Insert Text)  Physical Therapy Goals  Initiated 1/21/2018    4. Patient will ambulate with supervision/set-up for 300 feet with the least restrictive device within 7 day(s). 5.  Patient will ascend/descend 4 stairs with 1 handrail(s) with supervision/set-up within 7 day(s). physical Therapy EVALUATION  Patient: Alton Galicia (42 y.o. female)  Date: 1/21/2018  Primary Diagnosis: Shortness of breath        Precautions: fall       ASSESSMENT :  Based on the objective data described below, the patient presents with overall supervision to contact guard assist for mobility. Patient with dementia which does impede mobility but able to ambulate in jara with contact guard assist. She had one major deviation requiring mod assist to prevent fall. She tends to reach for rails in jara and likely does so with furniture in home. She has full support at home and feel she will be safe to discharge home with family. At this time, do not recommend assistive device due to her dementia and likely not remember to use. There is equipment in the home (rolling walker and cane) that could use but recommend home health PT to assess this in home environment which is familiar to patient. At this time recommend supervision/gait belt when up and walking in jara with staff or family. .    Patient will benefit from skilled intervention to address the above impairments.   Patients rehabilitation potential is considered to be Fair  Factors which may influence rehabilitation potential include:   []         None noted  [x]         Mental ability/status  []         Medical condition  []         Home/family situation and support systems  [x]         Safety awareness  []         Pain tolerance/management  []         Other:      PLAN :  Recommendations and Planned Interventions:  []           Bed Mobility Training             []    Neuromuscular Re-Education  []           Transfer Training                   []    Orthotic/Prosthetic Training  [x]           Gait Training                         []    Modalities  []           Therapeutic Exercises           []    Edema Management/Control  []           Therapeutic Activities            []    Patient and Family Training/Education  []           Other (comment):    Frequency/Duration: Patient will be followed by physical therapy  3 times a week to address goals. Discharge Recommendations: Home Health  Further Equipment Recommendations for Discharge: none     SUBJECTIVE:   Patient stated It's a cat home. referring to hospital    OBJECTIVE DATA SUMMARY:   HISTORY:    Past Medical History:   Diagnosis Date    DEMENTIA     Hypertension    History reviewed. No pertinent surgical history. Prior Level of Function/Home Situation: ambulate without assistive device;   Personal factors and/or comorbidities impacting plan of care:     Home Situation  Home Environment: Private residence  # Steps to Enter: 4  Living Alone: No  Support Systems: Family member(s), Spouse/Significant Other/Partner  Patient Expects to be Discharged to[de-identified] Private residence    EXAMINATION/PRESENTATION/DECISION MAKING:   Critical Behavior:  Neurologic State: Alert, Confused  Orientation Level: Oriented X4  Cognition: Appropriate decision making, Appropriate for age attention/concentration, Appropriate safety awareness, Follows commands     Hearing:   Auditory  Auditory Impairment: None  Range Of Motion:  AROM: Within functional limits           PROM: Within functional limits           Strength:    Strength: Generally decreased, functional                    Tone & Sensation:   Tone: Normal             Coordination:  Coordination: Generally decreased, functional  Vision:      Functional Mobility:  Bed Mobility:        Sit to Supine: Independent     Transfers:  Sit to Stand: Supervision  Stand to Sit: Supervision                       Balance: Sitting: Intact  Standing: Impaired; Without support  Standing - Static: Fair  Standing - Dynamic : Fair  Ambulation/Gait Training:  Distance (ft): 150 Feet (ft)  Assistive Device: Gait belt  Ambulation - Level of Assistance: Contact guard assistance     Gait Description (WDL): Exceptions to WDL  Gait Abnormalities: Trunk sway increased; Path deviations        Base of Support: Narrowed      Functional Measure:  Tinetti test:    Sitting Balance: 1  Arises: 1  Attempts to Rise: 2  Immediate Standing Balance: 0  Standing Balance: 1  Nudged: 0  Eyes Closed: 1  Turn 360 Degrees - Continuous/Discontinuous: 0  Turn 360 Degrees - Steady/Unsteady: 1  Sitting Down: 1  Balance Score: 8  Indication of Gait: 1  R Step Length/Height: 1  L Step Length/Height: 1  R Foot Clearance: 1  L Foot Clearance: 1  Step Symmetry: 1  Step Continuity: 1  Path: 1  Trunk: 0  Walking Time: 1  Gait Score: 9  Total Score: 17       Tinetti Test and G-code impairment scale:  Percentage of Impairment CH    0%   CI    1-19% CJ    20-39% CK    40-59% CL    60-79% CM    80-99% CN     100%   Tinetti  Score 0-28 28 23-27 17-22 12-16 6-11 1-5 0       Tinetti Tool Score Risk of Falls  <19 = High Fall Risk  19-24 = Moderate Fall Risk  25-28 = Low Fall Risk  Tinetti ME. Performance-Oriented Assessment of Mobility Problems in Elderly Patients. Sarah 66; [id number]. (Scoring Description: PT Bulletin Feb. 10, 1993)    Older adults: Luna Payne et al, 2009; n = 1000 [facility] elderly evaluated with ABC, KIM, ADL, and IADL)  · Mean KIM score for males aged 69-68 years = 26.21(3.40)  · Mean KIM score for females age 69-68 years = 25.16(4.30)  · Mean KIM score for males over 80 years = 23.29(6.02)  · Mean KIM score for females over 80 years = 17.20(8.32)         G codes: In compliance with CMSs Claims Based Outcome Reporting, the following G-code set was chosen for this patient based on their primary functional limitation being treated:     The outcome measure chosen to determine the severity of the functional limitation was the Tinetti with a score of 17/28 which was correlated with the impairment scale. ? Mobility - Walking and Moving Around:     - CURRENT STATUS: CJ - 20%-39% impaired, limited or restricted    - GOAL STATUS: CI - 1%-19% impaired, limited or restricted    - D/C STATUS:  ---------------To be determined---------------      Physical Therapy Evaluation Charge Determination   History Examination Presentation Decision-Making   LOW Complexity : Zero comorbidities / personal factors that will impact the outcome / POC LOW Complexity : 1-2 Standardized tests and measures addressing body structure, function, activity limitation and / or participation in recreation  LOW Complexity : Stable, uncomplicated  LOW Complexity : FOTO score of       Based on the above components, the patient evaluation is determined to be of the following complexity level: LOW     Pain:  Pain Scale 1: Numeric (0 - 10)  Pain Intensity 1: 0              Activity Tolerance:   Oxygen saturations remained in 90's throughout 96% mainly; heart rate 116 with gait  Please refer to the flowsheet for vital signs taken during this treatment. After treatment:   []         Patient left in no apparent distress sitting up in chair  [x]         Patient left in no apparent distress in bed  [x]         Call bell left within reach  [x]         Nursing notified  []         Caregiver present  []         Bed alarm activated    COMMUNICATION/EDUCATION:   The patients plan of care was discussed with: Occupational Therapist and Registered Nurse.  []         Fall prevention education was provided and the patient/caregiver indicated understanding. []         Patient/family have participated as able in goal setting and plan of care. []         Patient/family agree to work toward stated goals and plan of care.   []         Patient understands intent and goals of therapy, but is neutral about his/her participation. [x]         Patient is unable to participate in goal setting and plan of care.     Thank you for this referral.  Cecy Lay, PT   Time Calculation: 18 mins

## 2018-01-21 NOTE — PROGRESS NOTES
Problem: Self Care Deficits Care Plan (Adult)  Goal: *Acute Goals and Plan of Care (Insert Text)  Occupational Therapy Goals  Initiated: 1/21/2018    1. Patient will perform grooming with supervision/set-up standing at sink within 3 day(s). 2.  Patient will perform bathing with supervision/set-up from chair within 3 day(s). 3.  Patient will perform upper body dressing and lower body dressing with supervision/set-up within 3 day(s). 4.  Patient will perform toilet transfers with supervision/set-up within 3 day(s). 5.  Patient will perform all aspects of toileting with supervision/set-up within 3 day(s). Occupational Therapy EVALUATION  Patient: Nadiya Lindsey (79 y.o. female)  Date: 1/21/2018  Primary Diagnosis: Shortness of breath        Precautions:   Fall    ASSESSMENT :  Based on the objective data described below, the patient presents with decreased independence with self care and functional mobility following admission for SOB and increased confusion at home. She has been ill at home for the past week and noted with overall general debility. Daughter reports mentally she is not a clear as she typically is but is improving today following some sleep last night. She has assistance at home from her  and son. She was able to mobilize to bathroom with supervision and noted with LOB x 1 turning to commode. Recommend return home with 24 hour supervision and assistance. Patient will benefit from skilled intervention to address the above impairments.   Patients rehabilitation potential is considered to be Good  Factors which may influence rehabilitation potential include:   []             None noted  [x]             Mental ability/status  []             Medical condition  []             Home/family situation and support systems  []             Safety awareness  []             Pain tolerance/management  []             Other:      PLAN :  Recommendations and Planned Interventions:  [x] Self Care Training                  [x]        Therapeutic Activities  [x]               Functional Mobility Training    []        Cognitive Retraining  [x]               Therapeutic Exercises           [x]        Endurance Activities  [x]               Balance Training                   []        Neuromuscular Re-Education  []               Visual/Perceptual Training     [x]   Home Safety Training  [x]               Patient Education                 [x]        Family Training/Education  []               Other (comment):    Frequency/Duration: Patient will be followed by occupational therapy 5 times a week to address goals. Discharge Recommendations: None  Further Equipment Recommendations for Discharge: none     SUBJECTIVE:   Patient stated I like to keep it clean.     OBJECTIVE DATA SUMMARY:   HISTORY:   Past Medical History:   Diagnosis Date    DEMENTIA     Hypertension    History reviewed. No pertinent surgical history. Prior Level of Function/Environment/Context: pt was independent with basic tasks but has supervision at home. Expanded or extensive additional review of patient history:     Home Situation  Home Environment: Private residence  # Steps to Enter: 4  Rails to Enter: Yes  One/Two Story Residence: One story  Living Alone: No  Support Systems: Spouse/Significant Other/Partner, Family member(s)  Patient Expects to be Discharged to[de-identified] Private residence  Current DME Used/Available at Home: None  Tub or Shower Type: Shower  [x]  Right hand dominant   []  Left hand dominant    EXAMINATION OF PERFORMANCE DEFICITS:  Cognitive/Behavioral Status:  Neurologic State: Alert;Confused  Orientation Level: Oriented X4  Cognition: Appropriate for age attention/concentration  Perception: Appears intact  Perseveration: No perseveration noted  Safety/Judgement: Good awareness of safety precautions    Skin: see nursing notes    Edema: none noted    Hearing:   Auditory  Auditory Impairment: None    Vision/Perceptual:                           Acuity: Within Defined Limits         Range of Motion:    AROM: Within functional limits  PROM: Within functional limits                      Strength:    Strength: Within functional limits                Coordination:  Coordination: Within functional limits  Fine Motor Skills-Upper: Right Intact; Left Intact    Gross Motor Skills-Upper: Right Intact; Left Intact    Tone & Sensation:    Tone: Normal  Sensation: Intact                      Balance:  Sitting: Intact  Standing: Impaired; With support  Standing - Static: Fair  Standing - Dynamic : Fair    Functional Mobility and Transfers for ADLs:  Bed Mobility:  Supine to Sit: Additional time;Supervision  Sit to Supine: Independent    Transfers:  Sit to Stand: Supervision  Stand to Sit: Supervision  Bed to Chair: Supervision  Toilet Transfer : Supervision    ADL Assessment:  Feeding: Supervision    Oral Facial Hygiene/Grooming: Supervision    Bathing: Supervision    Upper Body Dressing: Supervision    Lower Body Dressing: Supervision    Toileting: Supervision                ADL Intervention and task modifications:     Pt was able to complete toileting and grooming this morning with OT. Pt did well with all activity and was able to complete transfer with supervision. She did have LOB with turning to the bathroom. She was able to regain her balance independently. She was able to complete grooming standing at the sink as well.         Cognitive Retraining  Safety/Judgement: Good awareness of safety precautions    Functional Measure:  Barthel Index:    Bathin  Bladder: 10  Bowels: 10  Groomin  Dressin  Feeding: 10  Mobility: 5  Stairs: 5  Toilet Use: 5  Transfer (Bed to Chair and Back): 10  Total: 65       Barthel and G-code impairment scale:  Percentage of impairment CH  0% CI  1-19% CJ  20-39% CK  40-59% CL  60-79% CM  80-99% CN  100%   Barthel Score 0-100 100 99-80 79-60 59-40 20-39 1-19   0   Barthel Score 0-20 20 17-19 13-16 9-12 5-8 1-4 0      The Barthel ADL Index: Guidelines  1. The index should be used as a record of what a patient does, not as a record of what a patient could do. 2. The main aim is to establish degree of independence from any help, physical or verbal, however minor and for whatever reason. 3. The need for supervision renders the patient not independent. 4. A patient's performance should be established using the best available evidence. Asking the patient, friends/relatives and nurses are the usual sources, but direct observation and common sense are also important. However direct testing is not needed. 5. Usually the patient's performance over the preceding 24-48 hours is important, but occasionally longer periods will be relevant. 6. Middle categories imply that the patient supplies over 50 per cent of the effort. 7. Use of aids to be independent is allowed. Saskia Hayden., Barthel, D.W. (8689). Functional evaluation: the Barthel Index. [facility] (14)2. EDMUNDO Nino, Arpan Zee., Juan Daniel Arteaga., [place], [address] [place] [address] (1999). Measuring the change indisability after inpatient rehabilitation; comparison of the responsiveness of the Barthel Index and Functional [place] Measure. Journal of Neurology, Neurosurgery, and Psychiatry, 66(4), 092-000. CARRIE Ross, JAQUELINE Thornton, & Alessio Tierney M.A. (2004.) Assessment of post-stroke quality of life in cost-effectiveness studies: The usefulness of the Barthel Index and the EuroQoL-5D. Quality of Life Research, 13, 177-60     G codes: In compliance with CMSs Claims Based Outcome Reporting, the following G-code set was chosen for this patient based on their primary functional limitation being treated: The outcome measure chosen to determine the severity of the functional limitation was the Barthel Index with a score of 65/100 which was correlated with the impairment scale. ?  Self Care:     - CURRENT STATUS: CJ - 20%-39% impaired, limited or restricted    - GOAL STATUS: CI - 1%-19% impaired, limited or restricted    - D/C STATUS:  ---------------To be determined---------------     Occupational Therapy Evaluation Charge Determination   History Examination Decision-Making   LOW Complexity : Brief history review  LOW Complexity : 1-3 performance deficits relating to physical, cognitive , or psychosocial skils that result in activity limitations and / or participation restrictions  LOW Complexity : No comorbidities that affect functional and no verbal or physical assistance needed to complete eval tasks       Based on the above components, the patient evaluation is determined to be of the following complexity level: LOW   Pain:  Pain Scale 1: Numeric (0 - 10)  Pain Intensity 1: 0              Activity Tolerance:   VSS throughout session. Saturations 96% on room air  After treatment:   [x] Patient left in no apparent distress sitting up in chair  [] Patient left in no apparent distress in bed  [x] Call bell left within reach  [x] Nursing notified  [] Caregiver present  [] Bed alarm activated    COMMUNICATION/EDUCATION:   The patients plan of care was discussed with: Physical Therapist and Registered Nurse. [x] Home safety education was provided and the patient/caregiver indicated understanding. [x] Patient/family have participated as able in goal setting and plan of care. [] Patient/family agree to work toward stated goals and plan of care. [] Patient understands intent and goals of therapy, but is neutral about his/her participation. [] Patient is unable to participate in goal setting and plan of care. This patients plan of care is appropriate for delegation to [facility].     Thank you for this referral.  Claudetta Bio, OT  Time Calculation: 27 mins

## 2018-01-21 NOTE — PROGRESS NOTES
Bedside and Verbal shift change report given to Nisha Rao RN (oncoming nurse) by Cristopher Godinez RN (offgoing nurse). Report included the following information SBAR, Kardex, Intake/Output, MAR and Recent Results.

## 2018-01-21 NOTE — DISCHARGE INSTRUCTIONS
Please bring this form with you to show your primary care provider at your follow-up appointment. Primary care provider:  Dr. Cody Pinzon MD    Discharging provider:  Dustin Thomas MD    You have been admitted to the hospital with the following diagnoses:  · Shortness of breath   · Acute bronchitis    FOLLOW-UP CARE RECOMMENDATIONS:    APPOINTMENTS:  Follow-up Information     Follow up With Details Comments Contact Info    Cody Pinzon MD Schedule an appointment as soon as possible for a visit in 1 week For follow up after hospitalization [address]  [phone]           FOLLOW-UP TESTS recommended: Basic metabolic panel in 1 week with primary care doctor    SYMPTOMS to watch for: worsening shortness of breath, fever, chills, nausea, vomiting, diarrhea. DIET/what to eat:  Cardiac Diet    ACTIVITY:  Activity as tolerated and PT per Home Health    EQUIPMENT needed:  Nebulizer, bed alarm    Medication instructions: Please do not take your normal home blood pressure medications, Hydrochlorothiazide (HCTZ) and Benazepril. We are substituting another blood pressure medicine called Amlodipine. Please follow up with your primary care doctor in 1 week. He will check some blood work and may switch you back to your original medications. What to do if new or unexpected symptoms occur? If you experience any of the above symptoms (or should other concerns or questions arise after discharge) please call your primary care physician. Return to the emergency room if you cannot get hold of your doctor. · It is very important that you keep your follow-up appointment(s). · Please bring discharge papers, medication list (and/or medication bottles) to your follow-up appointments for review by your outpatient provider(s). · Please check the list of medications and be sure it includes every medication (even non-prescription medications) that your provider wants you to take.     · It is important that you take the medication exactly as they are prescribed. · Keep your medication in the bottles provided by the pharmacist and keep a list of the medication names, dosages, and times to be taken in your wallet. · Do not take other medications without consulting your doctor. · If you have any questions about your medications or other instructions, please talk to your nurse or care provider before you leave the hospital.    I understand that if any problems occur once I am at home I am to contact my physician. These instructions were explained to me and I had the opportunity to ask questions.

## 2018-01-21 NOTE — DISCHARGE SUMMARY
Discharge Summary     Patient:  Radha Jimenez       MRN: [id number]       YOB: 1928       Age: 80 y.o. Date of admission:  1/19/2018    Date of discharge:  1/21/2018    Primary care provider: Dr. Cody Pinzon MD    Admitting provider:  Alfie Dougherty MD    Discharging provider:  Deana [address] [phone]. If unavailable, call [phone] and ask the  to page the triage hospitalist.    Consultations  None    Procedures  * No surgery found *    Discharge destination: Home. The patient is stable for discharge. Admission diagnosis  Shortness of breath    Current Discharge Medication List      START taking these medications    Details   albuterol-ipratropium (DUO-NEB) 2.5 mg-0.5 mg/3 ml nebu 3 mL by Nebulization route four (4) times daily for 7 days. Qty: 30 Nebule, Refills: 0      guaiFENesin ER (MUCINEX) 1,200 mg Ta12 ER tablet Take 1 Tab by mouth two (2) times a day. Can be obtained over-the-counter  Qty: 30 Tab, Refills: 0      predniSONE (DELTASONE) 20 mg tablet Take 2 Tabs by mouth daily (with dinner) for 4 days. Qty: 8 Tab, Refills: 0      azithromycin (ZITHROMAX) 250 mg tablet Take 1 Tab by mouth daily for 4 days. Received first dose in the hospital.  Qty: 4 Tab, Refills: 0      Nebulizer & Compressor machine 1 Each by Does Not Apply route four (4) times daily. Qty: 1 Each, Refills: 0      Nebulizer Accessories kit Use as needed with nebulizer  Qty: 1 Kit, Refills: 0      miscellaneous medical supply [facility] Bed Alarm for use at bedtime  Qty: 1 Each, Refills: 0         CONTINUE these medications which have CHANGED    Details   amLODIPine (NORVASC) 5 mg tablet Take 1 Tab by mouth daily.   Qty: 30 Tab, Refills: 0         CONTINUE these medications which have NOT CHANGED    Details   traZODone (DESYREL) 50 mg tablet Take 50 mg by mouth nightly as needed for Sleep. STOP taking these medications       benazepril (LOTENSIN) 20 mg tablet Comments:   Reason for Stopping:         Hydrochlorothiazide 12.5 mg tablet Comments:   Reason for Stopping: Follow-up Information     Follow up With Details Comments Contact Info    Alberta Atkins MD Schedule an appointment as soon as possible for a visit in 1 week For follow up after hospitalization [address]  [phone]            Final discharge diagnoses and brief hospital course  Please also refer to the admission H&P for details on the presenting problem. This is an 75-year-old [facility] [place] woman with past medical history significant for hypertension and dementia, who presented to [facility] Emergency Department with progressive shortness of breath and productive cough of whitish sputum for the last 1 week. Edvin Rodriguez has also associated generalized weakness, and unsteady when she walked.  No fever, chills, left-sided chest pain, palpitation, abdominal pain, urinary complaint or abnormal bowel movement.  No history of COPD, heart disease or history of tobacco abuse.  She was seen 9 days ago for the same in the ER, she was evaluated and discharged from the emergency room. This time in the [facility] did not show any acute process. Lab showed potassium 3.3,creatinine 1.37 with bun 37. Acute bronchitis (POA) - improving  - CXR 1/19 without acute process  - Continue neb  - Continue azithromycin started 1/20  - Continue prednisone     LEATHA (POA) - due to poor po intake with patient being on diuretic, improved  - Hold HCTZ, benazepril     Hypokalemia (POA) - Corrected but now K is slightly high  - Hold benazepril    Hypertension (chronic) - BP controlled  - Started on amlodipine.  Holding HCTZ and benazepril until follow up with PCP     Dementia (chronic) - Supportive care    Weakness (POA) - due to acute illness  - PT recommending home health    FOLLOW-UP CARE RECOMMENDATIONS: FOLLOW-UP TESTS recommended: Basic metabolic panel in 1 week with primary care doctor    SYMPTOMS to watch for: worsening shortness of breath, fever, chills, nausea, vomiting, diarrhea. DIET/what to eat:  Cardiac Diet    ACTIVITY:  Activity as tolerated and PT per Home Health    EQUIPMENT needed:  Nebulizer, bed alarm    Medication instructions: Please do not take your normal home blood pressure medications, Hydrochlorothiazide (HCTZ) and Benazepril. We are substituting another blood pressure medicine called Amlodipine. Please follow up with your primary care doctor in 1 week. He will check some blood work and may switch you back to your original medications. What to do if new or unexpected symptoms occur? If you experience any of the above symptoms (or should other concerns or questions arise after discharge) please call your primary care physician. Return to the emergency room if you cannot get hold of your doctor. · It is very important that you keep your follow-up appointment(s). · Please bring discharge papers, medication list (and/or medication bottles) to your follow-up appointments for review by your outpatient provider(s). · Please check the list of medications and be sure it includes every medication (even non-prescription medications) that your provider wants you to take. · It is important that you take the medication exactly as they are prescribed. · Keep your medication in the bottles provided by the pharmacist and keep a list of the medication names, dosages, and times to be taken in your wallet. · Do not take other medications without consulting your doctor.    · If you have any questions about your medications or other instructions, please talk to your nurse or care provider before you leave the hospital.    Physical examination at discharge  Visit Vitals    /64 (BP 1 Location: Right arm, BP Patient Position: At rest)    Pulse 74    Temp 97.9 °F (36.6 °C)    Resp 17    Ht 5' 4\" (1.626 m)    SpO2 95%     Gen - NAD, thin  HEENT - MMM  Neck - supple, full ROM  CV - RRR, no MRG  Resp - lungs with scattered rhonchi, no wheezing, normal WOB  GI - abdomen S, NT, ND, +BS. No hepatosplenomegaly   - no CVA tenderness, bladder non-palpable in lower abdomen  MSK - normal tone and bulk, no edema  Neuro - A&Ox2, no focal deficits  Psych - calm and cooperative with normal affect    Pertinent imaging studies:    CXR 1/19/18  FINDINGS: A portable AP radiograph of the chest was obtained at 1210 hours. The  patient is on a cardiac monitor. The lungs are clear. The cardiac and  mediastinal contours and pulmonary vascularity are normal.  Bony structures  appear intact.      IMPRESSION  IMPRESSION: No acute process identified. Recent Labs      01/21/18   0549  01/20/18   0413  01/19/18   1210   WBC  8.4  10.7  8.8   HGB  11.0*  10.5*  12.8   HCT  33.9*  33.1*  41.0   PLT  238  229  235     Recent Labs      01/21/18   0549  01/20/18   0413  01/19/18   1210   NA  139  142  144   K  5.0  5.2*  3.3*   CL  107  111*  106   CO2  25  25  26   BUN  27*  31*  37*   CREA  1.08*  0.99  1.37*   GLU  121*  111*  152*   CA  8.8  8.4*  9.4   MG   --    --   2.7*     Recent Labs      01/20/18   0413  01/19/18   1210   SGOT  15  22   AP  75  87   TP  6.2*  7.0   ALB  2.3*  2.7*   GLOB  3.9  4.3*     No results for input(s): INR, PTP, APTT in the last 72 hours. No lab exists for component: INREXT, INREXT   No results for input(s): FE, TIBC, PSAT, FERR in the last 72 hours. No results for input(s): PH, PCO2, PO2 in the last 72 hours. No results for input(s): CPK, CKMB in the last 72 hours.     No lab exists for component: TROPONINI  No components found for: [place]    Chronic Diagnoses:    Problem List as of 1/21/2018  Never Reviewed          Codes Class Noted - Resolved    Shortness of breath ICD-10-CM: R06.02  ICD-9-CM: 786.05  1/19/2018 - Present              Time spent on discharge related activities today greater than 30 minutes.       Signed:  Casey Lima MD                 Hospitalist, Internal Medicine      Cc: Leo Rome MD

## 2018-01-22 ENCOUNTER — HOME CARE VISIT (OUTPATIENT)
Dept: SCHEDULING | Facility: HOME HEALTH | Age: 83
End: 2018-01-22
Payer: MEDICARE

## 2018-01-22 VITALS
OXYGEN SATURATION: 96 % | TEMPERATURE: 98.3 F | DIASTOLIC BLOOD PRESSURE: 58 MMHG | SYSTOLIC BLOOD PRESSURE: 128 MMHG | RESPIRATION RATE: 22 BRPM | HEIGHT: 64 IN | HEART RATE: 92 BPM

## 2018-01-22 PROCEDURE — 3331090002 HH PPS REVENUE DEBIT

## 2018-01-22 PROCEDURE — 400013 HH SOC

## 2018-01-22 PROCEDURE — 3331090001 HH PPS REVENUE CREDIT

## 2018-01-22 PROCEDURE — G0151 HHCP-SERV OF PT,EA 15 MIN: HCPCS

## 2018-01-23 PROCEDURE — 3331090001 HH PPS REVENUE CREDIT

## 2018-01-23 PROCEDURE — 3331090002 HH PPS REVENUE DEBIT

## 2018-01-24 PROCEDURE — 3331090001 HH PPS REVENUE CREDIT

## 2018-01-24 PROCEDURE — 3331090002 HH PPS REVENUE DEBIT

## 2018-01-25 ENCOUNTER — HOME CARE VISIT (OUTPATIENT)
Dept: SCHEDULING | Facility: HOME HEALTH | Age: 83
End: 2018-01-25
Payer: MEDICARE

## 2018-01-25 VITALS
RESPIRATION RATE: 20 BRPM | TEMPERATURE: 98.2 F | DIASTOLIC BLOOD PRESSURE: 70 MMHG | SYSTOLIC BLOOD PRESSURE: 119 MMHG | OXYGEN SATURATION: 96 % | HEART RATE: 89 BPM

## 2018-01-25 PROCEDURE — 3331090002 HH PPS REVENUE DEBIT

## 2018-01-25 PROCEDURE — G0151 HHCP-SERV OF PT,EA 15 MIN: HCPCS

## 2018-01-25 PROCEDURE — 3331090001 HH PPS REVENUE CREDIT

## 2018-01-26 PROCEDURE — 3331090001 HH PPS REVENUE CREDIT

## 2018-01-26 PROCEDURE — 3331090002 HH PPS REVENUE DEBIT

## 2018-01-27 PROCEDURE — 3331090002 HH PPS REVENUE DEBIT

## 2018-01-27 PROCEDURE — 3331090001 HH PPS REVENUE CREDIT

## 2018-01-28 PROCEDURE — 3331090002 HH PPS REVENUE DEBIT

## 2018-01-28 PROCEDURE — 3331090001 HH PPS REVENUE CREDIT

## 2018-01-29 PROCEDURE — 3331090002 HH PPS REVENUE DEBIT

## 2018-01-29 PROCEDURE — 3331090001 HH PPS REVENUE CREDIT

## 2018-01-30 ENCOUNTER — HOME CARE VISIT (OUTPATIENT)
Dept: SCHEDULING | Facility: HOME HEALTH | Age: 83
End: 2018-01-30
Payer: MEDICARE

## 2018-01-30 VITALS
TEMPERATURE: 99 F | HEART RATE: 66 BPM | DIASTOLIC BLOOD PRESSURE: 50 MMHG | RESPIRATION RATE: 20 BRPM | SYSTOLIC BLOOD PRESSURE: 128 MMHG | OXYGEN SATURATION: 96 %

## 2018-01-30 PROCEDURE — G0151 HHCP-SERV OF PT,EA 15 MIN: HCPCS

## 2018-01-30 PROCEDURE — 3331090001 HH PPS REVENUE CREDIT

## 2018-01-30 PROCEDURE — 3331090002 HH PPS REVENUE DEBIT

## 2018-01-31 PROCEDURE — 3331090001 HH PPS REVENUE CREDIT

## 2018-01-31 PROCEDURE — 3331090002 HH PPS REVENUE DEBIT

## 2018-02-01 PROCEDURE — 3331090001 HH PPS REVENUE CREDIT

## 2018-02-01 PROCEDURE — 3331090002 HH PPS REVENUE DEBIT

## 2018-02-02 ENCOUNTER — HOME CARE VISIT (OUTPATIENT)
Dept: SCHEDULING | Facility: HOME HEALTH | Age: 83
End: 2018-02-02
Payer: MEDICARE

## 2018-02-02 PROCEDURE — G0151 HHCP-SERV OF PT,EA 15 MIN: HCPCS

## 2018-02-02 PROCEDURE — 3331090002 HH PPS REVENUE DEBIT

## 2018-02-02 PROCEDURE — 3331090001 HH PPS REVENUE CREDIT

## 2018-02-03 VITALS
HEART RATE: 89 BPM | RESPIRATION RATE: 18 BRPM | OXYGEN SATURATION: 98 % | DIASTOLIC BLOOD PRESSURE: 87 MMHG | SYSTOLIC BLOOD PRESSURE: 120 MMHG | TEMPERATURE: 98.6 F

## 2018-02-03 PROCEDURE — 3331090001 HH PPS REVENUE CREDIT

## 2018-02-03 PROCEDURE — 3331090002 HH PPS REVENUE DEBIT

## 2018-02-04 PROCEDURE — 3331090002 HH PPS REVENUE DEBIT

## 2018-02-04 PROCEDURE — 3331090001 HH PPS REVENUE CREDIT

## 2018-02-05 PROCEDURE — 3331090001 HH PPS REVENUE CREDIT

## 2018-02-05 PROCEDURE — 3331090002 HH PPS REVENUE DEBIT

## 2018-02-06 ENCOUNTER — HOME CARE VISIT (OUTPATIENT)
Dept: SCHEDULING | Facility: HOME HEALTH | Age: 83
End: 2018-02-06
Payer: MEDICARE

## 2018-02-06 VITALS
OXYGEN SATURATION: 98 % | RESPIRATION RATE: 20 BRPM | TEMPERATURE: 98.1 F | SYSTOLIC BLOOD PRESSURE: 115 MMHG | HEART RATE: 78 BPM | DIASTOLIC BLOOD PRESSURE: 87 MMHG

## 2018-02-06 PROCEDURE — 3331090002 HH PPS REVENUE DEBIT

## 2018-02-06 PROCEDURE — 3331090001 HH PPS REVENUE CREDIT

## 2018-02-06 PROCEDURE — G0151 HHCP-SERV OF PT,EA 15 MIN: HCPCS

## 2018-02-07 PROCEDURE — 3331090002 HH PPS REVENUE DEBIT

## 2018-02-07 PROCEDURE — 3331090001 HH PPS REVENUE CREDIT

## 2018-02-08 ENCOUNTER — HOME CARE VISIT (OUTPATIENT)
Dept: SCHEDULING | Facility: HOME HEALTH | Age: 83
End: 2018-02-08
Payer: MEDICARE

## 2018-02-08 VITALS
SYSTOLIC BLOOD PRESSURE: 121 MMHG | DIASTOLIC BLOOD PRESSURE: 81 MMHG | HEART RATE: 78 BPM | OXYGEN SATURATION: 98 % | TEMPERATURE: 98.5 F | RESPIRATION RATE: 20 BRPM

## 2018-02-08 PROCEDURE — 3331090001 HH PPS REVENUE CREDIT

## 2018-02-08 PROCEDURE — G0151 HHCP-SERV OF PT,EA 15 MIN: HCPCS

## 2018-02-08 PROCEDURE — 3331090002 HH PPS REVENUE DEBIT

## 2018-10-08 ENCOUNTER — HOSPITAL ENCOUNTER (EMERGENCY)
Age: 83
Discharge: HOME | End: 2018-10-08
Attending: EMERGENCY MEDICINE
Payer: MEDICARE

## 2018-10-08 VITALS
OXYGEN SATURATION: 98 % | TEMPERATURE: 98.4 F | DIASTOLIC BLOOD PRESSURE: 58 MMHG | WEIGHT: 111 LBS | RESPIRATION RATE: 16 BRPM | BODY MASS INDEX: 18.95 KG/M2 | SYSTOLIC BLOOD PRESSURE: 151 MMHG | HEIGHT: 64 IN | HEART RATE: 62 BPM

## 2018-10-08 DIAGNOSIS — E86.0 DEHYDRATION: ICD-10-CM

## 2018-10-08 DIAGNOSIS — R42 DIZZINESS: Primary | ICD-10-CM

## 2018-10-08 LAB
ALBUMIN SERPL-MCNC: 3.5 G/DL (ref 3.5–5)
ALBUMIN/GLOB SERPL: 1.1 {RATIO} (ref 1.1–2.2)
ALP SERPL-CCNC: 73 U/L (ref 45–117)
ALT SERPL-CCNC: 20 U/L (ref 12–78)
ANION GAP SERPL CALC-SCNC: 9 MMOL/L (ref 5–15)
AST SERPL-CCNC: 12 U/L (ref 15–37)
BASOPHILS # BLD: 0 K/UL (ref 0–0.1)
BASOPHILS NFR BLD: 0 % (ref 0–1)
BILIRUB SERPL-MCNC: 0.4 MG/DL (ref 0.2–1)
BUN SERPL-MCNC: 22 MG/DL (ref 6–20)
BUN/CREAT SERPL: 20 (ref 12–20)
CALCIUM SERPL-MCNC: 9 MG/DL (ref 8.5–10.1)
CHLORIDE SERPL-SCNC: 103 MMOL/L (ref 97–108)
CO2 SERPL-SCNC: 28 MMOL/L (ref 21–32)
CREAT SERPL-MCNC: 1.12 MG/DL (ref 0.55–1.02)
DIFFERENTIAL METHOD BLD: ABNORMAL
EOSINOPHIL # BLD: 0 K/UL (ref 0–0.4)
EOSINOPHIL NFR BLD: 0 % (ref 0–7)
ERYTHROCYTE [DISTWIDTH] IN BLOOD BY AUTOMATED COUNT: 12.3 % (ref 11.5–14.5)
GLOBULIN SER CALC-MCNC: 3.2 G/DL (ref 2–4)
GLUCOSE SERPL-MCNC: 130 MG/DL (ref 65–100)
HCT VFR BLD AUTO: 47.4 % (ref 35–47)
HGB BLD-MCNC: 14.9 G/DL (ref 11.5–16)
IMM GRANULOCYTES # BLD: 0 K/UL (ref 0–0.04)
IMM GRANULOCYTES NFR BLD AUTO: 0 % (ref 0–0.5)
LYMPHOCYTES # BLD: 1.2 K/UL (ref 0.8–3.5)
LYMPHOCYTES NFR BLD: 19 % (ref 12–49)
MCH RBC QN AUTO: 30.2 PG (ref 26–34)
MCHC RBC AUTO-ENTMCNC: 31.4 G/DL (ref 30–36.5)
MCV RBC AUTO: 96.1 FL (ref 80–99)
MONOCYTES # BLD: 0.4 K/UL (ref 0–1)
MONOCYTES NFR BLD: 7 % (ref 5–13)
NEUTS SEG # BLD: 4.6 K/UL (ref 1.8–8)
NEUTS SEG NFR BLD: 73 % (ref 32–75)
NRBC # BLD: 0 K/UL (ref 0–0.01)
NRBC BLD-RTO: 0 PER 100 WBC
PLATELET # BLD AUTO: 169 K/UL (ref 150–400)
PMV BLD AUTO: 9.8 FL (ref 8.9–12.9)
POTASSIUM SERPL-SCNC: 4.2 MMOL/L (ref 3.5–5.1)
PROT SERPL-MCNC: 6.7 G/DL (ref 6.4–8.2)
RBC # BLD AUTO: 4.93 M/UL (ref 3.8–5.2)
SODIUM SERPL-SCNC: 140 MMOL/L (ref 136–145)
WBC # BLD AUTO: 6.3 K/UL (ref 3.6–11)

## 2018-10-08 PROCEDURE — 74011250636 HC RX REV CODE- 250/636: Performed by: EMERGENCY MEDICINE

## 2018-10-08 PROCEDURE — 96360 HYDRATION IV INFUSION INIT: CPT

## 2018-10-08 PROCEDURE — 85025 COMPLETE CBC W/AUTO DIFF WBC: CPT | Performed by: EMERGENCY MEDICINE

## 2018-10-08 PROCEDURE — 36415 COLL VENOUS BLD VENIPUNCTURE: CPT | Performed by: EMERGENCY MEDICINE

## 2018-10-08 PROCEDURE — 93005 ELECTROCARDIOGRAM TRACING: CPT

## 2018-10-08 PROCEDURE — 80053 COMPREHEN METABOLIC PANEL: CPT | Performed by: EMERGENCY MEDICINE

## 2018-10-08 PROCEDURE — 99284 EMERGENCY DEPT VISIT MOD MDM: CPT

## 2018-10-08 RX ADMIN — SODIUM CHLORIDE 500 ML: 900 INJECTION, SOLUTION INTRAVENOUS at 16:14

## 2018-10-08 NOTE — ED TRIAGE NOTES
Pt was out walking with the family looking at tombstones for her husbands  and had a syncopal episode. The family guided her to the ground and they stated that the pt did respond to them so they began CPR. When fire arrived they stated the pt was alert and that the pt was not in cardiac arrest. Glucose was  151.

## 2018-10-08 NOTE — ED PROVIDER NOTES
HPI Comments: [address] y.o. female with past medical history significant for dementia, HTN, who presents via EMS, with chief complaint of dizziness. EMS personnel report that patient was ambulating while picking out a tombstone for her  who passed away on Tuesday when she experienced a syncopal episode. EMS report that family stated that patient felt dizzy and was made to lay down. They state that then patient lost conciousness and was unable to be woken up by family. EMS report that family then provided CPR to patient, but that patient did not experience cardiac arrest and that CPR was unnecessary. They note that patient has redness on chest because of CPR. Patient denies any pain. There are no other acute medical concerns at this time. Review of medical records indicate that patient has been seen in the ED three times since 2011 for syncope and dehydration, most recently by Dr. Tracy Reyna on 1/10/18. Patient was admitted for shortness of breath and weakness on 1/19/18, discharged 1/21/18. Social hx: Tobacco use (former smoker); Negative for EtOH use; Negative for Illicit Drug use PCP: Simon Herrmann MD 
 
Full history, physical exam, and ROS unable to be obtained due to:  dementia. Note written by [facility], as dictated by Pato Retana MD 3:10 PM  
 
 
The history is provided by the patient, medical records and the EMS personnel. The history is limited by the condition of the patient (Dementia). No  was used. Past Medical History:  
Diagnosis Date  DEMENTIA  Hypertension History reviewed. No pertinent surgical history. History reviewed. No pertinent family history. Social History Social History  Marital status:  Spouse name: N/A  
 Number of children: N/A  
 Years of education: N/A Occupational History  Not on file. Social History Main Topics  Smoking status: Former Smoker  Smokeless tobacco: Never Used  Alcohol use No  
 Drug use: No  
 Sexual activity: Not on file Other Topics Concern  Not on file Social History Narrative ALLERGIES: Review of patient's allergies indicates no known allergies. Review of Systems Unable to perform ROS: Dementia Vitals:  
 10/08/18 1511 10/08/18 1600 BP: 150/57 151/58 Pulse: 68 62 Resp: 14 16 Temp: 98.4 °F (36.9 °C) SpO2: 97% 98% Weight: 50.3 kg (111 lb) Height: 5' 4\" (1.626 m) Physical Exam  
Constitutional: She appears well-developed and well-nourished. No distress. HENT:  
Head: Normocephalic and atraumatic. Right Ear: External ear normal.  
Left Ear: External ear normal.  
Nose: Nose normal.  
Mouth/Throat: Oropharynx is clear and moist.  
Eyes: Conjunctivae and EOM are normal. Pupils are equal, round, and reactive to light. No scleral icterus. Neck: Normal range of motion. Neck supple. No JVD present. No tracheal deviation present. No thyromegaly present. Cardiovascular: Normal rate and regular rhythm. Exam reveals no gallop and no friction rub. Murmur heard. Systolic (Located in the LLSB and radiates to mitral area) murmur is present with a grade of 3/6 Pulmonary/Chest: Effort normal and breath sounds normal. No respiratory distress. She has no wheezes. She has no rales. She exhibits no tenderness. Abdominal: Soft. Bowel sounds are normal. She exhibits no distension and no mass. There is no tenderness. There is no rebound and no guarding. Musculoskeletal: Normal range of motion. She exhibits no edema or tenderness. Lymphadenopathy:  
  She has no cervical adenopathy. Neurological: She is alert. She has normal strength. She displays no atrophy and no tremor. No cranial nerve deficit. She exhibits normal muscle tone. Coordination and gait normal.  
Oriented x 1 Dementia Skin: Skin is warm and dry. No rash noted. She is not diaphoretic. No erythema. Psychiatric: She has a normal mood and affect. Her behavior is normal. Judgment and thought content normal.  
Nursing note and vitals reviewed. Note written by Yadira Jacob, as dictated by Donnell Rinne, MD 3:10 PM 
 
MDM Number of Diagnoses or Management Options Dehydration:  
Dizziness:  
Diagnosis management comments: CÉSAR Impression: 80-year-old female presents to the emergency department with a syncopal event at home. According to the family they started CPR however when the rescue squad arrived the patient was awake and alert. The patient has long-standing history of dementia thus no further history is obtainable. Her exam is unremarkable except for cardiac murmur. Differential includes syncope in the elderly, consider dehydration, electrolyte abnormalities or anemia. Plan of care will be baseline labs EKG and treat accordingly. ED Course Procedures ED EKG interpretation: 
Time: 1524 Rhythm: sinus rhythm with marked sinus arrhythmia; with 1st degree AV block. Rate (approx.): 64 bpm; 
Note written by Yadira Jacob, as dictated by Donnell Rinne, MD 3:29 PM  
 
PROGRESS NOTE: 
4:00 PM 
Family in the ED at this time. Dr. Epi Marin updated family about lab work plan and EKG results. 4:45 PM 
Patient's results have been reviewed with them. Patient and/or family have verbally conveyed their understanding and agreement of the patient's signs, symptoms, diagnosis, treatment and prognosis and additionally agree to follow up as recommended or return to the Emergency Room should their condition change prior to follow-up. Discharge instructions have also been provided to the patient with some educational information regarding their diagnosis as well a list of reasons why they would want to return to the ER prior to their follow-up appointment should their condition change.

## 2018-10-08 NOTE — DISCHARGE INSTRUCTIONS
Dehydration: Care Instructions  Your Care Instructions  Dehydration happens when your body loses too much fluid. This might happen when you do not drink enough water or you lose large amounts of fluids from your body because of diarrhea, vomiting, or sweating. Severe dehydration can be life-threatening. Water and minerals called electrolytes help put your body fluids back in balance. Learn the early signs of fluid loss, and drink more fluids to prevent dehydration. Follow-up care is a key part of your treatment and safety. Be sure to make and go to all appointments, and call your doctor if you are having problems. It's also a good idea to know your test results and keep a list of the medicines you take. How can you care for yourself at home? · To prevent dehydration, drink plenty of fluids, enough so that your urine is light yellow or clear like water. Choose water and other caffeine-free clear liquids until you feel better. If you have kidney, heart, or liver disease and have to limit fluids, talk with your doctor before you increase the amount of fluids you drink. · If you do not feel like eating or drinking, try taking small sips of water, sports drinks, or other rehydration drinks. · Get plenty of rest.  To prevent dehydration  · Add more fluids to your diet and daily routine, unless your doctor has told you not to. · During hot weather, drink more fluids. Drink even more fluids if you exercise a lot. Stay away from drinks with alcohol or caffeine. · Watch for the symptoms of dehydration. These include:  ¨ A dry, sticky mouth. ¨ Dark yellow urine, and not much of it. ¨ Dry and sunken eyes. ¨ Feeling very tired. · Learn what problems can lead to dehydration. These include:  ¨ Diarrhea, fever, and vomiting. ¨ Any illness with a fever, such as pneumonia or the flu. ¨ Activities that cause heavy sweating, such as endurance races and heavy outdoor work in hot or humid weather.   ¨ Alcohol or drug abuse or withdrawal.  ¨ Certain medicines, such as cold and allergy pills (antihistamines), diet pills (diuretics), and laxatives. ¨ Certain diseases, such as diabetes, cancer, and heart or kidney disease. When should you call for help? Call 911 anytime you think you may need emergency care. For example, call if:    · You passed out (lost consciousness).    Call your doctor now or seek immediate medical care if:    · You are confused and cannot think clearly.     · You are dizzy or lightheaded, or you feel like you may faint.     · You have signs of needing more fluids. You have sunken eyes and a dry mouth, and you pass only a little dark urine.     · You cannot keep fluids down.    Watch closely for changes in your health, and be sure to contact your doctor if:    · You are not making tears.     · Your skin is very dry and sags slowly back into place after you pinch it.     · Your mouth and eyes are very dry. Where can you learn more? Go to http://damion-derick.info/. Enter H744 in the search box to learn more about \"Dehydration: Care Instructions. \"  Current as of: November 20, 2017  Content Version: 11.8  © 8101-2570 [employer]. Care instructions adapted under license by [employer] (which disclaims liability or warranty for this information). If you have questions about a medical condition or this instruction, always ask your healthcare professional. [place] [address] any warranty or liability for your use of this information. Oral Rehydration: Care Instructions  Your Care Instructions    Dehydration occurs when your body loses too much water. This can happen if you do not drink enough fluids or lose a lot of fluid due to diarrhea, vomiting, or sweating. Being dehydrated can cause health problems and can even be life-threatening. To replace lost fluids, you need to drink liquid that contains special chemicals called electrolytes. Electrolytes keep your body working well. Plain water does not have electrolytes. You also need to rest to prevent more fluid loss. Replacing water and electrolytes (oral rehydration) completely takes about 36 hours. But you should feel better within a few hours. Follow-up care is a key part of your treatment and safety. Be sure to make and go to all appointments, and call your doctor if you are having problems. It's also a good idea to know your test results and keep a list of the medicines you take. How can you care for yourself at home? · Take frequent sips of a drink such as Gatorade, Powerade, or other rehydration drinks that your doctor suggests. These replace both fluid and important chemicals (electrolytes) you need for balance in your blood. · Drink 2 quarts of cool liquid over 2 to 4 hours. You should have at least 10 glasses of liquid a day to replace lost fluid. If you have kidney, heart, or liver disease and have to limit fluids, talk with your doctor before you increase the amount of fluids you drink. · Make your own drink. Measure everything carefully. The drink may not work well or may even be harmful if the amounts are off. ¨ 1 quart water  ¨ ½ teaspoon salt  ¨ 6 teaspoons sugar  · Do not drink liquid with caffeine, such as coffee and angelo. · Do not drink any alcohol. It can make you dehydrated. · Drink plenty of fluids, enough so that your urine is light yellow or clear like water. If you have kidney, heart, or liver disease and have to limit fluids, talk with your doctor before you increase the amount of fluids you drink. When should you call for help? Call 911 anytime you think you may need emergency care. For example, call if:    · You have signs of severe dehydration, such as:  ¨ You are confused or unable to stay awake. ¨ You passed out (lost consciousness).    Call your doctor now or seek immediate medical care if:    · You still have signs of dehydration.  You have sunken eyes and a dry mouth, and you pass only a little dark urine.     · You are dizzy or lightheaded, or you feel like you may faint.     · You are not able to keep down fluids.    Watch closely for changes in your health, and be sure to contact your doctor if:    · You do not get better as expected. Where can you learn more? Go to http://damion-derick.info/. Enter I040 in the search box to learn more about \"Oral Rehydration: Care Instructions. \"  Current as of: November 20, 2017  Content Version: 11.8  © 2832-4629 [employer]. Care instructions adapted under license by [employer] (which disclaims liability or warranty for this information). If you have questions about a medical condition or this instruction, always ask your healthcare professional. [address] any warranty or liability for your use of this information.

## 2018-10-09 LAB
ATRIAL RATE: 64 BPM
CALCULATED P AXIS, ECG09: 79 DEGREES
CALCULATED R AXIS, ECG10: -12 DEGREES
CALCULATED T AXIS, ECG11: 42 DEGREES
DIAGNOSIS, 93000: NORMAL
P-R INTERVAL, ECG05: 262 MS
Q-T INTERVAL, ECG07: 422 MS
QRS DURATION, ECG06: 74 MS
QTC CALCULATION (BEZET), ECG08: 435 MS
VENTRICULAR RATE, ECG03: 64 BPM

## 2020-02-21 ENCOUNTER — APPOINTMENT (OUTPATIENT)
Dept: VASCULAR SURGERY | Age: 85
End: 2020-02-21
Attending: EMERGENCY MEDICINE
Payer: MEDICARE

## 2020-02-21 ENCOUNTER — APPOINTMENT (OUTPATIENT)
Dept: GENERAL RADIOLOGY | Age: 85
End: 2020-02-21
Attending: EMERGENCY MEDICINE
Payer: MEDICARE

## 2020-02-21 ENCOUNTER — HOSPITAL ENCOUNTER (EMERGENCY)
Age: 85
Discharge: HOME HEALTH | End: 2020-02-21
Attending: EMERGENCY MEDICINE | Admitting: EMERGENCY MEDICINE
Payer: MEDICARE

## 2020-02-21 VITALS
BODY MASS INDEX: 18.95 KG/M2 | WEIGHT: 111 LBS | OXYGEN SATURATION: 97 % | HEART RATE: 67 BPM | HEIGHT: 64 IN | SYSTOLIC BLOOD PRESSURE: 167 MMHG | DIASTOLIC BLOOD PRESSURE: 62 MMHG | RESPIRATION RATE: 16 BRPM | TEMPERATURE: 98.9 F

## 2020-02-21 DIAGNOSIS — I44.1 SECOND DEGREE AV BLOCK, MOBITZ TYPE II: Primary | ICD-10-CM

## 2020-02-21 DIAGNOSIS — M25.561 ARTHRALGIA OF BOTH LOWER LEGS: ICD-10-CM

## 2020-02-21 DIAGNOSIS — F02.80 ALZHEIMER'S DEMENTIA WITHOUT BEHAVIORAL DISTURBANCE, UNSPECIFIED TIMING OF DEMENTIA ONSET: ICD-10-CM

## 2020-02-21 DIAGNOSIS — G30.9 ALZHEIMER'S DEMENTIA WITHOUT BEHAVIORAL DISTURBANCE, UNSPECIFIED TIMING OF DEMENTIA ONSET: ICD-10-CM

## 2020-02-21 DIAGNOSIS — M25.562 ARTHRALGIA OF BOTH LOWER LEGS: ICD-10-CM

## 2020-02-21 PROBLEM — I10 ESSENTIAL HYPERTENSION: Status: ACTIVE | Noted: 2020-02-21

## 2020-02-21 PROBLEM — G30.1 LATE ONSET ALZHEIMER'S DISEASE WITHOUT BEHAVIORAL DISTURBANCE (HCC): Status: ACTIVE | Noted: 2020-02-21

## 2020-02-21 LAB
ALBUMIN SERPL-MCNC: 3.4 G/DL (ref 3.5–5)
ALBUMIN/GLOB SERPL: 1.1 {RATIO} (ref 1.1–2.2)
ALP SERPL-CCNC: 84 U/L (ref 45–117)
ALT SERPL-CCNC: 17 U/L (ref 12–78)
ANION GAP SERPL CALC-SCNC: 2 MMOL/L (ref 5–15)
AST SERPL-CCNC: 12 U/L (ref 15–37)
ATRIAL RATE: 83 BPM
BASOPHILS # BLD: 0 K/UL (ref 0–0.1)
BASOPHILS NFR BLD: 1 % (ref 0–1)
BILIRUB SERPL-MCNC: 0.3 MG/DL (ref 0.2–1)
BUN SERPL-MCNC: 16 MG/DL (ref 6–20)
BUN/CREAT SERPL: 18 (ref 12–20)
CALCIUM SERPL-MCNC: 8.7 MG/DL (ref 8.5–10.1)
CALCULATED P AXIS, ECG09: 70 DEGREES
CALCULATED R AXIS, ECG10: 3 DEGREES
CALCULATED T AXIS, ECG11: 64 DEGREES
CHLORIDE SERPL-SCNC: 108 MMOL/L (ref 97–108)
CO2 SERPL-SCNC: 31 MMOL/L (ref 21–32)
COMMENT, HOLDF: NORMAL
CREAT SERPL-MCNC: 0.89 MG/DL (ref 0.55–1.02)
DIAGNOSIS, 93000: NORMAL
DIFFERENTIAL METHOD BLD: ABNORMAL
EOSINOPHIL # BLD: 0.1 K/UL (ref 0–0.4)
EOSINOPHIL NFR BLD: 2 % (ref 0–7)
ERYTHROCYTE [DISTWIDTH] IN BLOOD BY AUTOMATED COUNT: 12.7 % (ref 11.5–14.5)
GLOBULIN SER CALC-MCNC: 3.1 G/DL (ref 2–4)
GLUCOSE SERPL-MCNC: 97 MG/DL (ref 65–100)
HCT VFR BLD AUTO: 47.8 % (ref 35–47)
HGB BLD-MCNC: 15.2 G/DL (ref 11.5–16)
IMM GRANULOCYTES # BLD AUTO: 0 K/UL (ref 0–0.04)
IMM GRANULOCYTES NFR BLD AUTO: 0 % (ref 0–0.5)
LYMPHOCYTES # BLD: 1 K/UL (ref 0.8–3.5)
LYMPHOCYTES NFR BLD: 18 % (ref 12–49)
MCH RBC QN AUTO: 30.3 PG (ref 26–34)
MCHC RBC AUTO-ENTMCNC: 31.8 G/DL (ref 30–36.5)
MCV RBC AUTO: 95.2 FL (ref 80–99)
MONOCYTES # BLD: 0.6 K/UL (ref 0–1)
MONOCYTES NFR BLD: 10 % (ref 5–13)
NEUTS SEG # BLD: 4.1 K/UL (ref 1.8–8)
NEUTS SEG NFR BLD: 69 % (ref 32–75)
NRBC # BLD: 0 K/UL (ref 0–0.01)
NRBC BLD-RTO: 0 PER 100 WBC
PLATELET # BLD AUTO: 155 K/UL (ref 150–400)
PMV BLD AUTO: 10 FL (ref 8.9–12.9)
POTASSIUM SERPL-SCNC: 3.9 MMOL/L (ref 3.5–5.1)
PROT SERPL-MCNC: 6.5 G/DL (ref 6.4–8.2)
Q-T INTERVAL, ECG07: 454 MS
QRS DURATION, ECG06: 76 MS
QTC CALCULATION (BEZET), ECG08: 438 MS
RBC # BLD AUTO: 5.02 M/UL (ref 3.8–5.2)
SAMPLES BEING HELD,HOLD: NORMAL
SODIUM SERPL-SCNC: 141 MMOL/L (ref 136–145)
TROPONIN I SERPL-MCNC: <0.05 NG/ML
VENTRICULAR RATE, ECG03: 56 BPM
WBC # BLD AUTO: 5.8 K/UL (ref 3.6–11)

## 2020-02-21 PROCEDURE — 99285 EMERGENCY DEPT VISIT HI MDM: CPT

## 2020-02-21 PROCEDURE — 84484 ASSAY OF TROPONIN QUANT: CPT

## 2020-02-21 PROCEDURE — 93005 ELECTROCARDIOGRAM TRACING: CPT

## 2020-02-21 PROCEDURE — 80053 COMPREHEN METABOLIC PANEL: CPT

## 2020-02-21 PROCEDURE — 73610 X-RAY EXAM OF ANKLE: CPT

## 2020-02-21 PROCEDURE — 36415 COLL VENOUS BLD VENIPUNCTURE: CPT

## 2020-02-21 PROCEDURE — 74011250637 HC RX REV CODE- 250/637: Performed by: EMERGENCY MEDICINE

## 2020-02-21 PROCEDURE — 73630 X-RAY EXAM OF FOOT: CPT

## 2020-02-21 PROCEDURE — 93970 EXTREMITY STUDY: CPT

## 2020-02-21 PROCEDURE — 85025 COMPLETE CBC W/AUTO DIFF WBC: CPT

## 2020-02-21 RX ORDER — IBUPROFEN 600 MG/1
600 TABLET ORAL
Status: COMPLETED | OUTPATIENT
Start: 2020-02-21 | End: 2020-02-21

## 2020-02-21 RX ADMIN — IBUPROFEN 600 MG: 600 TABLET, FILM COATED ORAL at 18:23

## 2020-02-21 NOTE — ED TRIAGE NOTES
Patient arrives via EMS from home with c/o worsening right lower leg pain x 2 weeks. Per patients family patient was unable to bear weight on it today. Denies recent falls. Patient has hx dementia and is a poor historian.

## 2020-02-21 NOTE — CONSULTS
Cardiovascular Consult    Patient: Reilly Smith MRN: [id number]  SSN: [id number]    YOB: 1928  Age: 80 y.o. Sex: female       Subjective:      Date of  Admission: 2/21/2020   Primary Care Provider: Dk Gill MD  Admission type:   Chief Complaint   Patient presents with    Leg Pain         Reilly Smith is a 80 y.o.  female who presents with right leg pain. This consultation was requested by Dr. Raman Madden in the [facility] ER. The patient has a history of advanced dementia with increasing home care needs. She also has a history of Type II A-V block for the past 2 years. Initially this was Type 1, now Type 2. There is some history of syncope; however, family states this is rare and not a problem. Main issue is progressive weakness. They report she bumped into a bed (as did the daughter) and developed a bruise (as did the daughter). No syncope or pre-syncope. No chest pain. Occasional shortness of breath due to COPD. No fevers or chills. No diarrhea. No blood in stools or dark stools. The family notes PT is due to come next week, but they are unsure of how to care for her. With leg pain, she is not getting out of bed. Past Medical History:   Diagnosis Date    Dementia     Hypertension       History reviewed. No pertinent surgical history. History reviewed. No pertinent family history. Social History     Tobacco Use    Smoking status: Former Smoker    Smokeless tobacco: Never Used   Substance Use Topics    Alcohol use: No      No current facility-administered medications for this encounter. Current Outpatient Medications   Medication Sig    amLODIPine (NORVASC) 5 mg tablet Take 1 Tab by mouth daily.  guaiFENesin ER (MUCINEX) 1,200 mg Ta12 ER tablet Take 1 Tab by mouth two (2) times a day. Can be obtained over-the-counter (Patient not taking: Reported on 2/8/2018)    Nebulizer & Compressor machine 1 Each by Does Not Apply route four (4) times daily.     Nebulizer Accessories kit Use as needed with nebulizer    miscellaneous medical supply [facility] Bed Alarm for use at bedtime    traZODone (DESYREL) 50 mg tablet Take 50 mg by mouth nightly as needed for Sleep. No Known Allergies     Review of Symptoms:  Constitutional: No fevers or chills. HEET: No trauma. No visual changes. No hearing loss. No sore throat. Neck: No adenopathy or swelling. Heart: No chest pain or palpitations. Lungs: No shortness of breath. No cough. Abdomen: No pain. No nausea of vomiting. No BRBPR or melena. No diarrhea. Urology: No dysuria or hematuria. Ext: No edema. right leg pain. Skin: No acute rashes or ulcers. Endocrine: No heat or cold intolerance. Neuro: No transient neurologic deficits. Subjective:     Visit Vitals  /51   Pulse 62   Temp 99.3 °F (37.4 °C)   Resp 16   Ht 5' 4\" (1.626 m)   Wt 50.3 kg (111 lb)   SpO2 96%   BMI 19.05 kg/m²     Physical Exam: frail elderly female  Head: Normocephalic, atraumatic. Eyes: Pupils equal, round, reactive to light and accomodation. , Extra ocular muscles intact. Sclera anicteric. Ears: Grossly responsive to sound. Neck: No adenopathy. No bruits. Throat: No sores or erythema. Heart: Regular rate and rhythm. Normal S1 and S2. No murmurs, gallops, or rub. Lungs: Diminished effort bilaterally. No wheezing  Abdomen: Soft, non-tender. No guarding or rebound. No hepatosplenomegaly. Bowel sounds active. Ext: No edema. No ulceration. Bruise right shin  Skin: Normal coloration. Warm and dry. No rash. Neuro: Cranial nerves II through XII intact. Motor and sensory grossly intact. Affect: Alert. Nonsensical conversation.       Cardiographics:  ECG:  EKG Results     Procedure 720 Value Units Date/Time    EKG, 12 LEAD, INITIAL [[id number]] Collected:  02/21/20 1715    Order Status:  Completed Updated:  02/21/20 2923     Ventricular Rate 56 BPM      Atrial Rate 83 BPM      QRS Duration 76 ms      Q-T Interval 454 ms      QTC Calculation (Bezet) 438 ms      Calculated P Axis 70 degrees      Calculated R Axis 3 degrees      Calculated T Axis 64 degrees      Diagnosis --     Sinus rhythm with 2nd degree AV block (Mobitz I)  When compared with ECG of 08-OCT-2018 15:24,  Sinus rhythm is now with 2nd degree AV block (Mobitz I)  Confirmed by Abena Farrell MD. ([id number]) on 2/21/2020 5:43:13 PM          1/20/2018 Echocardiogram:   SUMMARY:  Left ventricle: Systolic function was normal. Ejection fraction was  estimated in the range of 55 % to 60 %. No obvious wall motion  abnormalities identified in the views obtained.     Atrial septum: The septum bows from left to right, consistent with  increased left atrial pressure.     Mitral valve: There was moderate regurgitation.     Aortic valve: Leaflets exhibited sclerosis.     Tricuspid valve: There was moderate regurgitation. There was mild  pulmonary hypertension.     INDICATIONS: Shortness of breath. (Juan Manuel)    2/21/2020 Venous U/S:  Interpretation Summary     · No evidence of right or left lower extremity vein thrombosis. · There is an incidental finding of a right popliteal fossa fluid collection about 0.8 by 3.5 centimeters.          Data Reviewed: CMP:   Lab Results   Component Value Date/Time     02/21/2020 05:24 PM    K 3.9 02/21/2020 05:24 PM     02/21/2020 05:24 PM    CO2 31 02/21/2020 05:24 PM    AGAP 2 (L) 02/21/2020 05:24 PM    GLU 97 02/21/2020 05:24 PM    BUN 16 02/21/2020 05:24 PM    CREA 0.89 02/21/2020 05:24 PM    GFRAA >60 02/21/2020 05:24 PM    GFRNA 59 (L) 02/21/2020 05:24 PM    CA 8.7 02/21/2020 05:24 PM    ALB 3.4 (L) 02/21/2020 05:24 PM    TP 6.5 02/21/2020 05:24 PM    GLOB 3.1 02/21/2020 05:24 PM    AGRAT 1.1 02/21/2020 05:24 PM    SGOT 12 (L) 02/21/2020 05:24 PM    ALT 17 02/21/2020 05:24 PM     CBC:   Lab Results   Component Value Date/Time    WBC 5.8 02/21/2020 05:24 PM    HGB 15.2 02/21/2020 05:24 PM    HCT 47.8 (H) 02/21/2020 05:24 PM     02/21/2020 05:24 PM     All Cardiac Markers in the last 24 hours:   Lab Results   Component Value Date/Time    TROIQ <0.05 02/21/2020 05:24 PM     ABG: No results found for: PH, PHI, PCO2, PCO2I, PO2, PO2I, HCO3, HCO3I, FIO2, FIO2I  COAGS: No results found for: APTT, PTP, INR, INREXT, INREXT, INREXT     Assessment:         Hospital Problems  Never Reviewed          Codes Class Noted POA    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 Loco Bradford is a 81 yo BF who presents with weakness and leg pain due to trauma from walking into a bed. No syncope or pre-syncope. No chest pain or shortness of breath. She has progressive conduction defect with adequate heart rate and blood pressure. No clear evidence of syncope at this time, and family is not concerned about that. They are mainly concerned about leg pain and weakness. Social service consult is appropriate with consideration of Home Health services. They are not interested in placement at this time. I discussed treatment of heart block, and we agree that Ms. Rios would not have improvement in quality of life with a pacemaker, but would have increased risk of complications. We have elected to pursue a conservative course at this time. Would also recommend addressing code status. I provided contact information should they wish to pursue further cardiac care.       Maikol Ruvalcaba MD  2/21/2020, 6:55 PM    [facility] Office:  [address]   [address] [place] [address]  [place], [address]  P: [phone]  F: [address] [facility] Office:  [address] [facility] [address]  [address]  [place], [id number] [place] [address]  P: [phone]  F: [phone]

## 2020-02-21 NOTE — ED PROVIDER NOTES
Please note that this dictation was completed with [employer], the computer voice recognition software.  Quite often unanticipated grammatical, syntax, homophones, and other interpretive errors are inadvertently transcribed by the computer software.  Please disregard these errors.  Please excuse any errors that have escaped final proofreading. 55-year-old -American female past medical history markable for severe dementia, hypertension presents with a family for \"increased weakness unable to ambulate today complaining of right lower leg pain. \"  Per the family the patient is constantly wandering about this difficult to \"keep an eye on her\". Family states she is severely demented at baseline and this is not changed significantly recently she has not been ill recently tolerating p.o. normally normal bladder bowel habits. They states she does bang her legs on things as she ambulates around the house, and that is what they think happened here. They said she would not ambulate this morning due to increased weakness more of a generalized nature in the right lower leg pain. When asked the patient states it is her left lower leg it hurts. We will image both legs and plan to place a consult for the Senior services team per family request for \"needs some help at the house. \"    pt / family denies falls/ HA, vison changes, diff swallowing, CP, SOB, Abd pain, F/Ch, N/V, D/Cons or other current systemic complaints    Social/ PSH reviewed in EMR    EMR Chart Reviewed; Past Medical History:   Diagnosis Date    Dementia     Hypertension        No past surgical history on file. No family history on file.     Social History     Socioeconomic History    Marital status:      Spouse name: Not on file    Number of children: Not on file    Years of education: Not on file    Highest education level: Not on file   Occupational History    Not on file   Social Needs    Financial resource strain: Not on file    Food insecurity:     Worry: Not on file     Inability: Not on file    Transportation needs:     Medical: Not on file     Non-medical: Not on file   Tobacco Use    Smoking status: Former Smoker    Smokeless tobacco: Never Used   Substance and Sexual Activity    Alcohol use: No    Drug use: No    Sexual activity: Not on file   Lifestyle    Physical activity:     Days per week: Not on file     Minutes per session: Not on file    Stress: Not on file   Relationships    Social connections:     Talks on phone: Not on file     Gets together: Not on file     Attends [employer]ism service: Not on file     Active member of club or organization: Not on file     Attends meetings of clubs or organizations: Not on file     Relationship status: Not on file    Intimate partner violence:     Fear of current or ex partner: Not on file     Emotionally abused: Not on file     Physically abused: Not on file     Forced sexual activity: Not on file   Other Topics Concern    Not on file   Social History Narrative    Not on file         ALLERGIES: Patient has no known allergies. Review of Systems   Unable to perform ROS: Dementia   Constitutional: Negative for appetite change, fever and unexpected weight change. HENT: Negative for trouble swallowing and voice change. Eyes: Negative for visual disturbance. Gastrointestinal: Negative for diarrhea and vomiting. Musculoskeletal: Positive for arthralgias, gait problem and myalgias. Skin: Positive for color change. Negative for rash and wound. Neurological: Negative for headaches. Psychiatric/Behavioral: Positive for behavioral problems and confusion. All other systems reviewed and are negative.       Vitals:    02/21/20 1523 02/21/20 1900   BP: 168/66 167/62   Pulse: 62 67   Resp: 16 16   Temp: 99.3 °F (37.4 °C) 98.9 °F (37.2 °C)   SpO2: 96% 97%   Weight: 50.3 kg (111 lb)    Height: 5' 4\" (1.626 m)             Physical Exam  Vitals signs and nursing note reviewed. Constitutional:       General: She is not in acute distress. Appearance: Normal appearance. She is well-developed and normal weight. She is not ill-appearing, toxic-appearing or diaphoretic. Comments: pleasently demented, NAD, AxOx1, speaking in complete sentences    gcs = 14 (memory)         HENT:      Head: Normocephalic and atraumatic. Right Ear: External ear normal.      Left Ear: External ear normal.      Nose: Nose normal.      Mouth/Throat:      Mouth: Mucous membranes are moist.   Eyes:      General: No scleral icterus. Right eye: No discharge. Conjunctiva/sclera: Conjunctivae normal.      Pupils: Pupils are equal, round, and reactive to light. Neck:      Musculoskeletal: Normal range of motion and neck supple. Vascular: No JVD. Trachea: No tracheal deviation. Cardiovascular:      Rate and Rhythm: Normal rate and regular rhythm. Pulses: Normal pulses. Heart sounds: Normal heart sounds. No murmur. No friction rub. No gallop. Pulmonary:      Effort: Pulmonary effort is normal. No respiratory distress. Breath sounds: Normal breath sounds. No wheezing or rales. Chest:      Chest wall: No tenderness. Abdominal:      General: Abdomen is flat. Bowel sounds are normal.      Palpations: Abdomen is soft. Tenderness: There is no abdominal tenderness. There is no guarding or rebound. Hernia: No hernia is present. Comments: nttp     Genitourinary:     Vagina: No vaginal discharge. Comments: Pt denies urinary/ vaginal complaints  Musculoskeletal: Normal range of motion. General: Signs of injury present. No swelling, tenderness or deformity. Right lower leg: No edema. Left lower leg: No edema. Comments: Pt had central/ paraspinal C/T/L spines, Upper/Lower ext long bones, Abdomen,  Pelvis, hands /feet and all joints palpated and tolerated well (except as above) ;  Pt has motor/ CV / Sensation grossly intact to all extremities;   Skin:     General: Skin is warm and dry. Capillary Refill: Capillary refill takes less than 2 seconds. Coloration: Skin is not jaundiced or pale. Findings: Bruising present. No erythema, lesion or rash. Neurological:      General: No focal deficit present. Mental Status: She is alert. Mental status is at baseline. Cranial Nerves: No cranial nerve deficit. Sensory: No sensory deficit. Motor: No weakness or abnormal muscle tone. Coordination: Coordination normal.      Gait: Gait normal.      Deep Tendon Reflexes: Reflexes normal.      Comments: pt has motor/ CV/ Sensation grossly intact to all extremities, R = L in strength;   Psychiatric:         Behavior: Behavior normal.         Thought Content: Thought content normal.          MDM       Procedures    Chief Complaint   Patient presents with    Leg Pain       3:22 PM  The patients presenting problems have been discussed, and they are in agreement with the care plan formulated and outlined with them. I have encouraged them to ask questions as they arise throughout their visit. MEDICATIONS GIVEN:  Medications - No data to display    LABS REVIEWED:  Labs Reviewed - No data to display    RADIOLOGY RESULTS:  The following have been ordered and reviewed:  _____________________________________________________________________  _____________________________________________________________________    EKG interpretation:   Rhythm: sinus bradycardia rhythm in a mobitz II pattern; and regular . Rate (approx.): 56; Axis: normal; P wave: normal; QRS interval: normal ; ST/T wave: normal; Negative acute significant segmental elevations/ new when compared to study dated 10/08/2018    PROCEDURES:        CONSULTATIONS:       PROGRESS NOTES:      DIAGNOSIS:    1. Second degree AV block, Mobitz type II    2. Arthralgia of both lower legs    3.  Alzheimer's dementia without behavioral disturbance, unspecified timing of dementia onset [facility])        PLAN:  1-d/c home per family      ED COURSE: The patients hospital course has been uncomplicated. 5:48 PM   Pt/ family updated; awaiting SS evaluation    CONSULT  NOTE  6:24 PM  Nemo Soriano MD spoke with Dr Adenike Bryant. Specialty: Cardiology  Discussed pt's hx, disposition, and available diagnostic and imaging results. Reviewed care plans. Consulting physician agrees with plans as outlined 'I will see the Pt';      6:42 PM  Dr Adenike Bryant  Has seen/ evaluated pt/ 'will see as an OP'; SS now seeing Pt/ Family to help address needs;         7:04 PM  Gwen w/ SS has seen/ evaluated pt; 'They have the resources they need being arranged';     7:05 PM  Deirdre Snellen  results have been reviewed with her. She has been counseled regarding her diagnosis. She verbally conveys understanding and agreement of the signs, symptoms, diagnosis, treatment and prognosis and additionally agrees to Call/ Arrange follow up as recommended with Dr. Lexis Hill MD in 24 - 48 hours. She also agrees with the care-plan and conveys that all of her questions have been answered. I have also put together some discharge instructions for her that include: 1) educational information regarding their diagnosis, 2) how to care for their diagnosis at home, as well a 3) list of reasons why they would want to return to the ED prior to their follow-up appointment, should their condition change or for concerns.

## 2020-02-22 NOTE — DISCHARGE INSTRUCTIONS
Patient Education        Alzheimer's Disease: Care Instructions  Your Care Instructions    Alzheimer's disease is a type of dementia. It causes memory loss and affects judgment, language, and behavior. You may have trouble making decisions or may get lost in places that you used to know well. Alzheimer's disease is different than mild memory loss that occurs with aging. It is not clear what causes Alzheimer's disease, but it is the most common form of dementia in older adults. Finding out that you have this disease is a shock. You may be afraid and worried about how the condition will change your life. Although there is no cure at this time, medicine in some cases may slow memory loss for a while. Other medicines may be able to help you sleep or cope with depression and behavior changes. Alzheimer's disease is different for everyone. It may take many years to develop. In some cases, people can function well for a long time. In the early stage of the disease, you can do things at home to make life easier and safer. You also can keep doing your hobbies and other activities. Many people find comfort in planning now for their future needs. Follow-up care is a key part of your treatment and safety. Be sure to make and go to all appointments, and call your doctor if you are having problems. It's also a good idea to know your test results and keep a list of the medicines you take. How can you care for yourself at home? Taking care of yourself  · If your doctor gives you medicines, take them exactly as prescribed. Call your doctor if you think you are having a problem with your medicine. You will get more details on the medicines your doctor prescribes. · Eat a balanced diet. Get plenty of whole grains, fruits, and vegetables every day. If you are not hungry at mealtimes, eat snacks at midmorning and in the afternoon.  Try drinks such as Boost, Ensure, or Sustacal if you are having trouble keeping your weight up.  · Stay active. Exercise such as walking may slow the decline of your mental abilities. Try to stay active mentally too. Read and work crossword puzzles if you enjoy these activities. · If you have trouble sleeping, do not nap during the day. Get regular exercise (but not within several hours of bedtime). Drink a glass of warm milk or caffeine-free herbal tea before going to bed. · Ask your doctor about support groups and other resources in your area. They can help people who have Alzheimer's disease and their families. · Be patient. You may find that a task takes you longer than it used to. · If you have not already done so, make a list of advance directives. Advance directives are instructions to your doctor and family members about what kind of care you want if you become unable to speak or express yourself. Talk to a  about making a will, if you do not already have one. Keeping schedules  · Develop a routine. You will feel less frustrated or confused if you have a clear, simple plan of what to do every day. ? Make lists of your medicines and when to take them. ? Write down appointments and other tasks in a calendar. ? Put sticky notes around the house to help you remember events and other things you have to do. ? Schedule activities and tasks for times of the day when you are best able to handle them. Staying safe  · Tell someone when you are going out and where you are going. Let the person know when you will be back. Before you go out alone, write down where you are going, how to get there, and how to get back home. Do this even if you have gone there many times before. Take someone along with you when possible. · Make your home safe. Tack down rugs, put no-slip tape in the tub, use handrails, and put safety switches on stoves and appliances. · Have a family member or other caregiver tell you whether you are driving badly. Deciding to stop driving is very hard for many people.  Driving helps you feel independent. Your state 's license bureau can do a driving test if there is any question. Plan for other means of getting around when you are no longer able to drive. · Use strong lighting, especially at night. Put night-lights in bedrooms, hallways, and bathrooms. · Lower the hot water temperature setting to 120°F or lower to avoid burns. When should you call for help? Call 911 anytime you think you may need emergency care. For example, call if:    · You are lost and do not know whom to call.     · You are injured and do not know whom to call.    Call your doctor now or seek immediate medical care if:    · Your symptoms suddenly get much worse.    Watch closely for changes in your health, and be sure to contact your doctor if:    · You want more information about how you can take care of yourself. Where can you learn more? Go to http://damion-derick.info/. Enter Y179 in the search box to learn more about \"Alzheimer's Disease: Care Instructions. \"  Current as of: May 28, 2019  Content Version: 12.2  © 3611-0030 [email or web address]. Care instructions adapted under license by [employer] (which disclaims liability or warranty for this information). If you have questions about a medical condition or this instruction, always ask your healthcare professional. [place] [address] any warranty or liability for your use of this information. Patient Education        Cardiac Arrhythmia: Care Instructions  Your Care Instructions    A cardiac arrhythmia is a change in the normal rhythm of the heart. Your heart may beat too fast or too slow or beat with an irregular or skipping rhythm. A change in the heart's rhythm may feel like a really strong heartbeat or a fluttering in your chest. A severe heart rhythm problem can keep the body from getting the blood it needs. This can result in shortness of breath, lightheadedness, and fainting.   You may take medicine to treat your condition. Your doctor may recommend a pacemaker or recommend catheter ablation to destroy small parts of the heart that are causing a rhythm problem. Another possible treatment is an implantable cardioverter-defibrillator (ICD). An ICD is a device that gives the heart a shock to return the heart to a normal rhythm. Follow-up care is a key part of your treatment and safety. Be sure to make and go to all appointments, and call your doctor if you are having problems. It's also a good idea to know your test results and keep a list of the medicines you take. How can you care for yourself at home?  [facility]    · Be safe with medicines. Take your medicines exactly as prescribed. Call your doctor if you think you are having a problem with your medicine. You will get more details on the specific medicines your doctor prescribes.     · If you received a pacemaker or an ICD, you will get a fact sheet about it.     · Wear medical alert jewelry that says you have an abnormal heart rhythm. You can buy this at most drugstores.    Lifestyle changes    · Eat a heart-healthy diet.     · Stay at a healthy weight. Lose weight if you need to.     · Avoid nicotine, too much alcohol, and illegal drugs (meth, speed, and cocaine). Also, get enough sleep and do not overeat.     · Ask your doctor whether you can take over-the-counter medicines (such as decongestants). These can make your heart beat fast.     · Talk to your doctor about any limits to activities, such as driving, or tasks where you use power tools or ladders. Activity    · Start light exercise if your doctor says you can. Even a small amount will help you get stronger, have more energy, and manage your stress.     · Get regular exercise. Try for 30 minutes on most days of the week. Ask your doctor what level of exercise is safe for you.  If activity is not likely to cause health problems, you probably do not have limits on the type or level of activity that you can do. You may want to walk, swim, bike, or do other activities.     · When you exercise, watch for signs that your heart is working too hard. You are pushing too hard if you cannot talk while you exercise. If you become short of breath or dizzy or have chest pain, sit down and rest.     · Check your pulse daily. Place two fingers on the artery at the palm side of your wrist, in line with your thumb. If your heartbeat seems uneven, talk to your doctor. When should you call for help? Call 911 anytime you think you may need emergency care. For example, call if:    · You have symptoms of sudden heart failure. These may include:  ? Severe trouble breathing. ? A fast or irregular heartbeat. ? Coughing up pink, foamy mucus. ? You passed out.     · You have signs of a stroke. These include:  ? Sudden numbness, paralysis, or weakness in your face, arm, or leg, especially on only one side of your body. ? New problems with walking or balance. ? Sudden vision changes. ? Drooling or slurred speech. ? New problems speaking or understanding simple statements, or feeling confused. ? A sudden, severe headache that is different from past headaches.    Call your doctor now or seek immediate medical care if:    · You have new or changed symptoms of heart failure, such as:  ? New or increased shortness of breath. ? New or worse swelling in your legs, ankles, or feet. ? Sudden weight gain, such as more than 2 to 3 pounds in a day or 5 pounds in a week. (Your doctor may suggest a different range of weight gain.)  ? Feeling dizzy or lightheaded or like you may faint. ? Feeling so tired or weak that you cannot do your usual activities. ? Not sleeping well. Shortness of breath wakes you at night. You need extra pillows to prop yourself up to breathe easier.    Watch closely for changes in your health, and be sure to contact your doctor if:    · You do not get better as expected.    Where can you learn more?  Go to http://damion-derick.info/. Enter B026 in the search box to learn more about \"Cardiac Arrhythmia: Care Instructions. \"  Current as of: April 9, 2019  Content Version: 12.2  © 1158-6057 [employer]. Care instructions adapted under license by [employer] (which disclaims liability or warranty for this information). If you have questions about a medical condition or this instruction, always ask your healthcare professional. [address] any warranty or liability for your use of this information.

## 2020-02-22 NOTE — PROGRESS NOTES
Date of previous inpatient admission/ ED visit? 1/19/18-1/21/18 OBS (SOB)    What brought the patient back to ED? Patient presents to the ED w/ chief compliant of right lower leg pain    Did patient decline recommended services during last admission/ ED visit (if yes, what)? No    Has patient seen a provider since their last inpatient admission/ED visit (if yes, when)? Yes. PCP is Chanel Sotelo (seen last week)    CM Interventions:  From previous inpatient admission/ED visit: Assessment  From current inpatient admission/ED visit: Assessment    SSED/CM consult received and appreciated. EMR reviewed. Met w/patient, family (daughter Shanti Mccormack and son Domo Isabel) and caregiver - introduced to role of CM. Patient lives in her home w/ son (since 2006). Daughter Kamla Corrigan [phone] is Consumer Directive Agent . Caregiver has provided support in the home for 1.5 years average schedule is 10:30A-3:30P (M-F) and additional hours available if needed. Shirley inquired about patient having a wheelchair prescribed from the ED informed would need to be initiated  by PCP. Discussed HH referral and referral to [facility]. Caregiver states Emma Alvarez has set up PT for next week however unsure of name of agency. Shirley and Maura Khan declined referral to [facility] will meet as family first then call number and request resources/ coordination. Discussed stretcher transport home and medicare to determine payment. Family elects to drive patient home will need assistance to vehicle and help patient into home once arrives . [employer] ([facility]) is local pharmacy. [facility] and [facility] are insurance providers. No AMD on file. Care Management Interventions  PCP Verified by CM:  Yes  Last Visit to PCP: 02/14/20  Palliative Care Criteria Met (RRAT>21 & CHF Dx)?: No  Transition of Care Consult (CM Consult): Discharge Planning  MyChart Signup: No  Discharge Durable Medical Equipment: No  Health Maintenance Reviewed: Yes  Physical Therapy Consult: Yes  Occupational Therapy Consult: No  Speech Therapy Consult: No  Current Support Network: Own Home, Lives with Caregiver, Has Personal Caregivers  Confirm Follow Up Transport: Family  [employer] Information Provided?: No     Updates provided to Cam Mccallum and [employer].

## 2020-02-22 NOTE — ED NOTES
Discharge instructions given to patient by MD. Pt has been given counseling on medication use and verbalizes understanding. IV d/c. Pt wheeled off of unit in no signs of distress.

## 2020-03-04 ENCOUNTER — HOSPITAL ENCOUNTER (EMERGENCY)
Age: 85
Discharge: HOME | End: 2020-03-04
Attending: EMERGENCY MEDICINE | Admitting: EMERGENCY MEDICINE
Payer: MEDICARE

## 2020-03-04 ENCOUNTER — APPOINTMENT (OUTPATIENT)
Dept: GENERAL RADIOLOGY | Age: 85
End: 2020-03-04
Attending: EMERGENCY MEDICINE
Payer: MEDICARE

## 2020-03-04 ENCOUNTER — APPOINTMENT (OUTPATIENT)
Dept: CT IMAGING | Age: 85
End: 2020-03-04
Attending: EMERGENCY MEDICINE
Payer: MEDICARE

## 2020-03-04 VITALS
TEMPERATURE: 97.9 F | DIASTOLIC BLOOD PRESSURE: 129 MMHG | BODY MASS INDEX: 17.71 KG/M2 | WEIGHT: 103.17 LBS | HEART RATE: 59 BPM | SYSTOLIC BLOOD PRESSURE: 170 MMHG | RESPIRATION RATE: 22 BRPM | OXYGEN SATURATION: 94 %

## 2020-03-04 DIAGNOSIS — R55 SYNCOPE AND COLLAPSE: Primary | ICD-10-CM

## 2020-03-04 DIAGNOSIS — R00.1 BRADYCARDIA: ICD-10-CM

## 2020-03-04 LAB
ALBUMIN SERPL-MCNC: 3.2 G/DL (ref 3.5–5)
ALBUMIN/GLOB SERPL: 1 {RATIO} (ref 1.1–2.2)
ALP SERPL-CCNC: 77 U/L (ref 45–117)
ALT SERPL-CCNC: 18 U/L (ref 12–78)
ANION GAP SERPL CALC-SCNC: 4 MMOL/L (ref 5–15)
APPEARANCE UR: CLEAR
AST SERPL-CCNC: 14 U/L (ref 15–37)
ATRIAL RATE: 79 BPM
BACTERIA URNS QL MICRO: NEGATIVE /HPF
BASOPHILS # BLD: 0 K/UL (ref 0–0.1)
BASOPHILS NFR BLD: 1 % (ref 0–1)
BILIRUB SERPL-MCNC: 0.4 MG/DL (ref 0.2–1)
BILIRUB UR QL: NEGATIVE
BUN SERPL-MCNC: 25 MG/DL (ref 6–20)
BUN/CREAT SERPL: 24 (ref 12–20)
CALCIUM SERPL-MCNC: 8.8 MG/DL (ref 8.5–10.1)
CALCULATED P AXIS, ECG09: 82 DEGREES
CALCULATED R AXIS, ECG10: -26 DEGREES
CALCULATED T AXIS, ECG11: 57 DEGREES
CHLORIDE SERPL-SCNC: 110 MMOL/L (ref 97–108)
CO2 SERPL-SCNC: 29 MMOL/L (ref 21–32)
COLOR UR: ABNORMAL
COMMENT, HOLDF: NORMAL
CREAT SERPL-MCNC: 1.04 MG/DL (ref 0.55–1.02)
DIAGNOSIS, 93000: NORMAL
DIFFERENTIAL METHOD BLD: NORMAL
EOSINOPHIL # BLD: 0.1 K/UL (ref 0–0.4)
EOSINOPHIL NFR BLD: 1 % (ref 0–7)
EPITH CASTS URNS QL MICRO: ABNORMAL /LPF
ERYTHROCYTE [DISTWIDTH] IN BLOOD BY AUTOMATED COUNT: 12.5 % (ref 11.5–14.5)
GLOBULIN SER CALC-MCNC: 3.2 G/DL (ref 2–4)
GLUCOSE SERPL-MCNC: 128 MG/DL (ref 65–100)
GLUCOSE UR STRIP.AUTO-MCNC: NEGATIVE MG/DL
HCT VFR BLD AUTO: 45.5 % (ref 35–47)
HGB BLD-MCNC: 14.3 G/DL (ref 11.5–16)
HGB UR QL STRIP: ABNORMAL
IMM GRANULOCYTES # BLD AUTO: 0 K/UL (ref 0–0.04)
IMM GRANULOCYTES NFR BLD AUTO: 0 % (ref 0–0.5)
KETONES UR QL STRIP.AUTO: NEGATIVE MG/DL
LEUKOCYTE ESTERASE UR QL STRIP.AUTO: NEGATIVE
LYMPHOCYTES # BLD: 1.2 K/UL (ref 0.8–3.5)
LYMPHOCYTES NFR BLD: 26 % (ref 12–49)
MCH RBC QN AUTO: 30.1 PG (ref 26–34)
MCHC RBC AUTO-ENTMCNC: 31.4 G/DL (ref 30–36.5)
MCV RBC AUTO: 95.8 FL (ref 80–99)
MONOCYTES # BLD: 0.3 K/UL (ref 0–1)
MONOCYTES NFR BLD: 8 % (ref 5–13)
NEUTS SEG # BLD: 2.9 K/UL (ref 1.8–8)
NEUTS SEG NFR BLD: 64 % (ref 32–75)
NITRITE UR QL STRIP.AUTO: NEGATIVE
NRBC # BLD: 0 K/UL (ref 0–0.01)
NRBC BLD-RTO: 0 PER 100 WBC
PH UR STRIP: 5 [PH] (ref 5–8)
PLATELET # BLD AUTO: 168 K/UL (ref 150–400)
PMV BLD AUTO: 10.3 FL (ref 8.9–12.9)
POTASSIUM SERPL-SCNC: 3.9 MMOL/L (ref 3.5–5.1)
PROT SERPL-MCNC: 6.4 G/DL (ref 6.4–8.2)
PROT UR STRIP-MCNC: ABNORMAL MG/DL
Q-T INTERVAL, ECG07: 450 MS
QRS DURATION, ECG06: 86 MS
QTC CALCULATION (BEZET), ECG08: 422 MS
RBC # BLD AUTO: 4.75 M/UL (ref 3.8–5.2)
RBC #/AREA URNS HPF: ABNORMAL /HPF (ref 0–5)
SAMPLES BEING HELD,HOLD: NORMAL
SODIUM SERPL-SCNC: 143 MMOL/L (ref 136–145)
SP GR UR REFRACTOMETRY: 1.02 (ref 1–1.03)
TROPONIN I SERPL-MCNC: <0.05 NG/ML
UROBILINOGEN UR QL STRIP.AUTO: 0.2 EU/DL (ref 0.2–1)
VENTRICULAR RATE, ECG03: 53 BPM
WBC # BLD AUTO: 4.4 K/UL (ref 3.6–11)
WBC URNS QL MICRO: ABNORMAL /HPF (ref 0–4)

## 2020-03-04 PROCEDURE — P9612 CATHETERIZE FOR URINE SPEC: HCPCS

## 2020-03-04 PROCEDURE — 84484 ASSAY OF TROPONIN QUANT: CPT

## 2020-03-04 PROCEDURE — 51701 INSERT BLADDER CATHETER: CPT

## 2020-03-04 PROCEDURE — 85025 COMPLETE CBC W/AUTO DIFF WBC: CPT

## 2020-03-04 PROCEDURE — 93005 ELECTROCARDIOGRAM TRACING: CPT

## 2020-03-04 PROCEDURE — 36415 COLL VENOUS BLD VENIPUNCTURE: CPT

## 2020-03-04 PROCEDURE — 80053 COMPREHEN METABOLIC PANEL: CPT

## 2020-03-04 PROCEDURE — 71046 X-RAY EXAM CHEST 2 VIEWS: CPT

## 2020-03-04 PROCEDURE — 70450 CT HEAD/BRAIN W/O DYE: CPT

## 2020-03-04 PROCEDURE — 81001 URINALYSIS AUTO W/SCOPE: CPT

## 2020-03-04 PROCEDURE — 77030019905 HC CATH URETH INTMIT MDII -A

## 2020-03-04 PROCEDURE — 74011250636 HC RX REV CODE- 250/636: Performed by: EMERGENCY MEDICINE

## 2020-03-04 PROCEDURE — 99285 EMERGENCY DEPT VISIT HI MDM: CPT

## 2020-03-04 RX ADMIN — SODIUM CHLORIDE 500 ML: 900 INJECTION, SOLUTION INTRAVENOUS at 16:18

## 2020-03-04 NOTE — ED PROVIDER NOTES
Ms. Stanislav Thurman is a 80-year-old female who presents to the ER after syncopal event. She was at home with her son and home health nurse. She had a witnessed syncopal event in front of them. She was unresponsive to verbal or painful stimuli for approximately 2 to 3 minutes. She senses come back to her baseline. Her family said that she has passed out about 2 or 3 other times in her life. This thought at this time that she may have been dehydrated. Her family says that she drinks fluids regularly at home. However, this is not a common occurrence for her. She was noted to be bradycardic by EMS. She denies any pain or any other complaints recently. Her daughter does say that she has had generalized weakness  over the last 2 days. She reports that she is also urinated on herself a few times which is not normal for her. She has not had any fevers or chills. No nausea or vomiting. No chest pain or trouble breathing. No changes with her bowel movements. She has had a good appetite. Patient's history is limited by her dementia. Past Medical History:   Diagnosis Date    Dementia     Hypertension        History reviewed. No pertinent surgical history. History reviewed. No pertinent family history.     Social History     Socioeconomic History    Marital status:      Spouse name: Not on file    Number of children: Not on file    Years of education: Not on file    Highest education level: Not on file   Occupational History    Not on file   Social Needs    Financial resource strain: Not on file    Food insecurity:     Worry: Not on file     Inability: Not on file    Transportation needs:     Medical: Not on file     Non-medical: Not on file   Tobacco Use    Smoking status: Former Smoker    Smokeless tobacco: Never Used   Substance and Sexual Activity    Alcohol use: No    Drug use: No    Sexual activity: Not on file   Lifestyle    Physical activity:     Days per week: Not on file Minutes per session: Not on file    Stress: Not on file   Relationships    Social connections:     Talks on phone: Not on file     Gets together: Not on file     Attends Rastafarian service: Not on file     Active member of club or organization: Not on file     Attends meetings of clubs or organizations: Not on file     Relationship status: Not on file    Intimate partner violence:     Fear of current or ex partner: Not on file     Emotionally abused: Not on file     Physically abused: Not on file     Forced sexual activity: Not on file   Other Topics Concern    Not on file   Social History Narrative    Not on file         ALLERGIES: Patient has no known allergies. Review of Systems   Constitutional: Positive for fatigue. Negative for chills and fever. HENT: Negative for rhinorrhea and sore throat. Respiratory: Negative for cough and shortness of breath. Cardiovascular: Negative for chest pain. Gastrointestinal: Negative for abdominal pain, diarrhea, nausea and vomiting. Genitourinary: Negative for dysuria and urgency. Musculoskeletal: Negative for arthralgias and back pain. Skin: Negative for rash. Neurological: Positive for syncope. Negative for dizziness, weakness and light-headedness. Vitals:    03/04/20 1429   BP: (!) 189/39   Pulse: (!) 55   Resp: 18   Temp: 97.9 °F (36.6 °C)   SpO2: 97%            Physical Exam     Vital signs reviewed. Nursing notes reviewed.     Const:  No acute distress, well developed, well nourished  Head:  Atraumatic, normocephalic  Eyes:  PERRL, conjunctiva normal, no scleral icterus  Neck:  Supple, trachea midline  Cardiovascular:  bradycardic, no murmurs, no gallops, no rubs  Resp:  No resp distress, no increased work of breathing, no wheezes, no rhonchi, no rales,  Abd:  Soft, non-tender, non-distended, no rebound, no guarding, no CVA tenderness  MSK:  No pedal edema, normal ROM  Neuro:  Alert and awake, no cranial nerve defect  Skin:  Warm, dry, intact  Psych: pleasant and cooperative          MDM  Number of Diagnoses or Management Options  Bradycardia:   Syncope and collapse:      Amount and/or Complexity of Data Reviewed  Clinical lab tests: ordered and reviewed  Tests in the radiology section of CPT®: ordered and reviewed  Review and summarize past medical records: yes    Patient Progress  Patient progress: stable          Ms. Mikaela Campos is a 69-year-old female who presents to the ER after syncopal event. She has had multiple syncopal events in the past.  She is demented and does not really remember much of what happened. She was found to be bradycardic with a heart rate in the 30s to 60s. She has a second-degree Mobitz 1 block. I had a long conversation with the family about our options. I have recommended admission to the hospital.  I spoke with the 2 daughters and her son approximately 3-4 times by the same discussion. They have decided that they would not pursue any further treatment, including a pacemaker. They would like her to be discharged and they will follow-up with a cardiologist as an outpatient. She is to follow-up or return to the ER with any new worsening symptoms.       Procedures

## 2020-03-04 NOTE — PROGRESS NOTES
Admission Medication Reconciliation:    Information obtained from:  Son and daughter  Alyssa Cano:  YES    Comments/Recommendations: Updated PTA meds/reviewed patient's allergies. Son and daughter are reliable historians. Interview included questions regarding use of PTA medications including prescription/OTC, vitamins, supplements, inhaled, topical, injectable, otic and ophthalmic medications. Notes:  Amlodipine: son provides to patient, \"no chance\" she could have had accidental ingestion. Flu vaccine: received about three weeks ago, also current on pneumococcal per daughter    Medication changes (since last review):  Deleted:  Mucinex  Trazodone    Thank you for allowing me to participate in the care of your patient. Lavelle StanfordD, RN #[id number] [address] [facility] benefit data reflects medications filled and processed through the patient's insurance, however   this data does NOT capture whether the medication was picked up or is currently being taken by the patient. Allergies:  Patient has no known allergies. Significant PMH/Disease States:   Past Medical History:   Diagnosis Date    Dementia     Hypertension      Chief Complaint for this Admission:    Chief Complaint   Patient presents with    Syncope     Prior to Admission Medications:   Prior to Admission Medications   Prescriptions Last Dose Informant Taking? amLODIPine (NORVASC) 5 mg tablet 3/4/2020 at Unknown time  Yes   Sig: Take 1 Tab by mouth daily. Facility-Administered Medications: None       Please contact the main inpatient pharmacy with any questions or concerns at [phone] and we will direct you to the clinical pharmacist covering this patient's care while in-house.    JOSSIE Boone

## 2020-03-04 NOTE — ED TRIAGE NOTES
Arrives via EMS from home where she lives with son for syncopal episode while home health RN was there taking vitals r/t generalized weakness. Once pt was placed supine, pt came to.   Home health RN noticed pts HR lower than normal.      en route

## 2020-03-05 ENCOUNTER — DOCUMENTATION ONLY (OUTPATIENT)
Dept: CARDIOLOGY CLINIC | Age: 85
End: 2020-03-05

## 2020-03-06 ENCOUNTER — TELEPHONE (OUTPATIENT)
Dept: CARDIOLOGY CLINIC | Age: 85
End: 2020-03-06

## 2020-03-06 NOTE — PROGRESS NOTES
I spoke to her daughter tonight  She wants to meet me next Tuesday in clinic with the patient to go over pacemaker implant

## 2020-03-06 NOTE — TELEPHONE ENCOUNTER
Verified patient with two types of identifiers. Spoke with Carolina Prince, verified on HIPAA. Offered appointment on Tuesday. Carolina Prince states they will not be able to make Tuesday and requested another day. Scheduled patient 3/12/20 at 12:00 pm. Asked how patient is doing. Carolina Prince states she is doing well. Notified Carolina Prince to please call if anything changes. Patient's daughter verbalized understanding and will call with any other questions.

## 2020-03-23 ENCOUNTER — TELEPHONE (OUTPATIENT)
Dept: CARDIOLOGY CLINIC | Age: 85
End: 2020-03-23

## 2020-03-23 NOTE — TELEPHONE ENCOUNTER
Called Patient Left a message to return call to office at their earliest convenience. Please schedule for 1:20 pm same day.

## 2020-03-24 ENCOUNTER — OFFICE VISIT (OUTPATIENT)
Dept: CARDIOLOGY CLINIC | Age: 85
End: 2020-03-24

## 2020-03-24 DIAGNOSIS — R55 SYNCOPE AND COLLAPSE: ICD-10-CM

## 2020-03-24 DIAGNOSIS — I44.1 MOBITZ TYPE 2 SECOND DEGREE ATRIOVENTRICULAR BLOCK: Primary | ICD-10-CM

## 2020-03-24 NOTE — TELEPHONE ENCOUNTER
Called Pt. Two patient identifiers confirmed. Called Yane Taylor back stated that she will come in a 4 and will not be moving appointment up. Pt verbalized understanding of information discussed w/ no further questions at this time.

## 2020-03-24 NOTE — PROGRESS NOTES
appt canceled due to COVID 19 restrictions  I spoke to her daughter and she said the patient has dementia and other illnesses and she is agreeable with comfort care DNR so will not consider pacemaker at this time

## 2020-03-24 NOTE — TELEPHONE ENCOUNTER
Pt daughter Misa Painter called asking if there is any other option beside a PPM. Notified Misa Painter I will ask Dr. Ross Sage and Get back to her.      Spoke with Dr. Ross Sage the only other option is to do nothing the Pt needs a PPM.

## 2020-12-09 ENCOUNTER — TRANSCRIBE ORDER (OUTPATIENT)
Dept: SCHEDULING | Age: 85
End: 2020-12-09

## 2020-12-09 DIAGNOSIS — Z12.31 VISIT FOR SCREENING MAMMOGRAM: Primary | ICD-10-CM
